# Patient Record
Sex: FEMALE | Race: WHITE | Employment: OTHER | URBAN - METROPOLITAN AREA
[De-identification: names, ages, dates, MRNs, and addresses within clinical notes are randomized per-mention and may not be internally consistent; named-entity substitution may affect disease eponyms.]

---

## 2024-06-09 ENCOUNTER — APPOINTMENT (OUTPATIENT)
Dept: GENERAL RADIOLOGY | Age: 79
DRG: 056 | End: 2024-06-09
Payer: MEDICARE

## 2024-06-09 ENCOUNTER — HOSPITAL ENCOUNTER (INPATIENT)
Age: 79
LOS: 9 days | Discharge: HOME | DRG: 056 | End: 2024-06-18
Attending: EMERGENCY MEDICINE | Admitting: INTERNAL MEDICINE
Payer: MEDICARE

## 2024-06-09 DIAGNOSIS — G12.22 BULBAR PALSY (HCC): ICD-10-CM

## 2024-06-09 DIAGNOSIS — R63.4 WEIGHT LOSS: ICD-10-CM

## 2024-06-09 DIAGNOSIS — I21.4 NSTEMI (NON-ST ELEVATED MYOCARDIAL INFARCTION) (HCC): ICD-10-CM

## 2024-06-09 DIAGNOSIS — R79.89 ELEVATED TROPONIN: ICD-10-CM

## 2024-06-09 DIAGNOSIS — R13.10 DYSPHAGIA, UNSPECIFIED TYPE: Primary | ICD-10-CM

## 2024-06-09 DIAGNOSIS — R47.1 DYSARTHRIA: ICD-10-CM

## 2024-06-09 PROBLEM — Z85.038 HISTORY OF COLON CANCER: Status: ACTIVE | Noted: 2024-06-09

## 2024-06-09 PROBLEM — I10 HYPERTENSION: Status: ACTIVE | Noted: 2024-06-09

## 2024-06-09 PROBLEM — J84.9 INTERSTITIAL LUNG DISEASE (HCC): Status: ACTIVE | Noted: 2024-06-09

## 2024-06-09 LAB
ALBUMIN SERPL-MCNC: 3.3 G/DL (ref 3.5–5.2)
ALBUMIN/GLOB SERPL: 0.7 {RATIO} (ref 1–2.5)
ALP SERPL-CCNC: 66 U/L (ref 35–104)
ALT SERPL-CCNC: <5 U/L (ref 10–35)
ANION GAP SERPL CALCULATED.3IONS-SCNC: 16 MMOL/L (ref 9–16)
ANTI-XA UNFRAC HEPARIN: <0.1 IU/L
AST SERPL-CCNC: 35 U/L (ref 10–35)
BASOPHILS # BLD: 0.04 K/UL (ref 0–0.2)
BASOPHILS NFR BLD: 0 % (ref 0–2)
BILIRUB SERPL-MCNC: 0.3 MG/DL (ref 0–1.2)
BUN SERPL-MCNC: 24 MG/DL (ref 8–23)
CALCIUM SERPL-MCNC: 9.2 MG/DL (ref 8.6–10.4)
CHLORIDE SERPL-SCNC: 96 MMOL/L (ref 98–107)
CO2 SERPL-SCNC: 24 MMOL/L (ref 20–31)
CREAT SERPL-MCNC: 0.9 MG/DL (ref 0.5–0.9)
CRP SERPL HS-MCNC: 35 MG/L (ref 0–5)
EOSINOPHIL # BLD: 0.05 K/UL (ref 0–0.44)
EOSINOPHILS RELATIVE PERCENT: 1 % (ref 1–4)
ERYTHROCYTE [DISTWIDTH] IN BLOOD BY AUTOMATED COUNT: 13.9 % (ref 11.8–14.4)
ERYTHROCYTE [DISTWIDTH] IN BLOOD BY AUTOMATED COUNT: 14 % (ref 11.8–14.4)
ERYTHROCYTE [SEDIMENTATION RATE] IN BLOOD BY PHOTOMETRIC METHOD: 84 MM/HR (ref 0–30)
GFR, ESTIMATED: 66 ML/MIN/1.73M2
GLUCOSE SERPL-MCNC: 77 MG/DL (ref 74–99)
HCT VFR BLD AUTO: 36.2 % (ref 36.3–47.1)
HCT VFR BLD AUTO: 40.7 % (ref 36.3–47.1)
HGB BLD-MCNC: 11.1 G/DL (ref 11.9–15.1)
HGB BLD-MCNC: 12.6 G/DL (ref 11.9–15.1)
IMM GRANULOCYTES # BLD AUTO: 0.03 K/UL (ref 0–0.3)
IMM GRANULOCYTES NFR BLD: 0 %
INR PPP: 1.1
LACTIC ACID, WHOLE BLOOD: 1.9 MMOL/L (ref 0.7–2.1)
LYMPHOCYTES NFR BLD: 0.75 K/UL (ref 1.1–3.7)
LYMPHOCYTES RELATIVE PERCENT: 7 % (ref 24–43)
MAGNESIUM SERPL-MCNC: 2.2 MG/DL (ref 1.6–2.4)
MCH RBC QN AUTO: 28 PG (ref 25.2–33.5)
MCH RBC QN AUTO: 28.1 PG (ref 25.2–33.5)
MCHC RBC AUTO-ENTMCNC: 30.7 G/DL (ref 28.4–34.8)
MCHC RBC AUTO-ENTMCNC: 31 G/DL (ref 28.4–34.8)
MCV RBC AUTO: 90.4 FL (ref 82.6–102.9)
MCV RBC AUTO: 91.6 FL (ref 82.6–102.9)
MONOCYTES NFR BLD: 0.68 K/UL (ref 0.1–1.2)
MONOCYTES NFR BLD: 6 % (ref 3–12)
NEUTROPHILS NFR BLD: 86 % (ref 36–65)
NEUTS SEG NFR BLD: 9.43 K/UL (ref 1.5–8.1)
NRBC BLD-RTO: 0 PER 100 WBC
NRBC BLD-RTO: 0 PER 100 WBC
PARTIAL THROMBOPLASTIN TIME: 26.1 SEC (ref 23–36.5)
PLATELET # BLD AUTO: 310 K/UL (ref 138–453)
PLATELET # BLD AUTO: 352 K/UL (ref 138–453)
PMV BLD AUTO: 9.7 FL (ref 8.1–13.5)
PMV BLD AUTO: 9.7 FL (ref 8.1–13.5)
POTASSIUM SERPL-SCNC: 3.6 MMOL/L (ref 3.7–5.3)
PROT SERPL-MCNC: 7.9 G/DL (ref 6.6–8.7)
PROTHROMBIN TIME: 14.2 SEC (ref 11.7–14.9)
RBC # BLD AUTO: 3.95 M/UL (ref 3.95–5.11)
RBC # BLD AUTO: 4.5 M/UL (ref 3.95–5.11)
SODIUM SERPL-SCNC: 136 MMOL/L (ref 136–145)
TROPONIN I SERPL HS-MCNC: 82 NG/L (ref 0–14)
TROPONIN I SERPL HS-MCNC: 93 NG/L (ref 0–14)
TSH SERPL DL<=0.05 MIU/L-ACNC: 2.2 UIU/ML (ref 0.27–4.2)
WBC OTHER # BLD: 11 K/UL (ref 3.5–11.3)
WBC OTHER # BLD: 9.8 K/UL (ref 3.5–11.3)

## 2024-06-09 PROCEDURE — 85520 HEPARIN ASSAY: CPT

## 2024-06-09 PROCEDURE — 85025 COMPLETE CBC W/AUTO DIFF WBC: CPT

## 2024-06-09 PROCEDURE — 85610 PROTHROMBIN TIME: CPT

## 2024-06-09 PROCEDURE — 71046 X-RAY EXAM CHEST 2 VIEWS: CPT

## 2024-06-09 PROCEDURE — 82164 ANGIOTENSIN I ENZYME TEST: CPT

## 2024-06-09 PROCEDURE — 86235 NUCLEAR ANTIGEN ANTIBODY: CPT

## 2024-06-09 PROCEDURE — 86140 C-REACTIVE PROTEIN: CPT

## 2024-06-09 PROCEDURE — 2580000003 HC RX 258: Performed by: STUDENT IN AN ORGANIZED HEALTH CARE EDUCATION/TRAINING PROGRAM

## 2024-06-09 PROCEDURE — 84484 ASSAY OF TROPONIN QUANT: CPT

## 2024-06-09 PROCEDURE — 2060000000 HC ICU INTERMEDIATE R&B

## 2024-06-09 PROCEDURE — 99223 1ST HOSP IP/OBS HIGH 75: CPT | Performed by: STUDENT IN AN ORGANIZED HEALTH CARE EDUCATION/TRAINING PROGRAM

## 2024-06-09 PROCEDURE — 99285 EMERGENCY DEPT VISIT HI MDM: CPT

## 2024-06-09 PROCEDURE — 93005 ELECTROCARDIOGRAM TRACING: CPT

## 2024-06-09 PROCEDURE — 86225 DNA ANTIBODY NATIVE: CPT

## 2024-06-09 PROCEDURE — 83605 ASSAY OF LACTIC ACID: CPT

## 2024-06-09 PROCEDURE — 36415 COLL VENOUS BLD VENIPUNCTURE: CPT

## 2024-06-09 PROCEDURE — 80053 COMPREHEN METABOLIC PANEL: CPT

## 2024-06-09 PROCEDURE — 6360000002 HC RX W HCPCS: Performed by: STUDENT IN AN ORGANIZED HEALTH CARE EDUCATION/TRAINING PROGRAM

## 2024-06-09 PROCEDURE — 85027 COMPLETE CBC AUTOMATED: CPT

## 2024-06-09 PROCEDURE — 2580000003 HC RX 258

## 2024-06-09 PROCEDURE — 85730 THROMBOPLASTIN TIME PARTIAL: CPT

## 2024-06-09 PROCEDURE — 84443 ASSAY THYROID STIM HORMONE: CPT

## 2024-06-09 PROCEDURE — 85652 RBC SED RATE AUTOMATED: CPT

## 2024-06-09 PROCEDURE — 83735 ASSAY OF MAGNESIUM: CPT

## 2024-06-09 PROCEDURE — 86038 ANTINUCLEAR ANTIBODIES: CPT

## 2024-06-09 RX ORDER — OMEPRAZOLE 20 MG/1
20 CAPSULE, DELAYED RELEASE ORAL 2 TIMES DAILY
Status: ON HOLD | COMMUNITY
End: 2024-06-13 | Stop reason: HOSPADM

## 2024-06-09 RX ORDER — SODIUM CHLORIDE 9 MG/ML
INJECTION, SOLUTION INTRAVENOUS CONTINUOUS
Status: DISCONTINUED | OUTPATIENT
Start: 2024-06-09 | End: 2024-06-11

## 2024-06-09 RX ORDER — ACETAMINOPHEN 650 MG/1
650 SUPPOSITORY RECTAL EVERY 6 HOURS PRN
Status: DISCONTINUED | OUTPATIENT
Start: 2024-06-09 | End: 2024-06-18 | Stop reason: HOSPADM

## 2024-06-09 RX ORDER — ACETAMINOPHEN 325 MG/1
650 TABLET ORAL EVERY 6 HOURS PRN
Status: DISCONTINUED | OUTPATIENT
Start: 2024-06-09 | End: 2024-06-18 | Stop reason: HOSPADM

## 2024-06-09 RX ORDER — HEPARIN SODIUM 1000 [USP'U]/ML
60 INJECTION, SOLUTION INTRAVENOUS; SUBCUTANEOUS PRN
Status: DISCONTINUED | OUTPATIENT
Start: 2024-06-09 | End: 2024-06-11

## 2024-06-09 RX ORDER — ALBUTEROL SULFATE 90 UG/1
2 AEROSOL, METERED RESPIRATORY (INHALATION) EVERY 6 HOURS PRN
COMMUNITY

## 2024-06-09 RX ORDER — ONDANSETRON 2 MG/ML
4 INJECTION INTRAMUSCULAR; INTRAVENOUS EVERY 6 HOURS PRN
Status: DISCONTINUED | OUTPATIENT
Start: 2024-06-09 | End: 2024-06-18 | Stop reason: HOSPADM

## 2024-06-09 RX ORDER — ONDANSETRON 4 MG/1
4 TABLET, ORALLY DISINTEGRATING ORAL EVERY 8 HOURS PRN
Status: DISCONTINUED | OUTPATIENT
Start: 2024-06-09 | End: 2024-06-18 | Stop reason: HOSPADM

## 2024-06-09 RX ORDER — POTASSIUM CHLORIDE 7.45 MG/ML
10 INJECTION INTRAVENOUS PRN
Status: DISCONTINUED | OUTPATIENT
Start: 2024-06-09 | End: 2024-06-18 | Stop reason: HOSPADM

## 2024-06-09 RX ORDER — FLUTICASONE FUROATE AND VILANTEROL 200; 25 UG/1; UG/1
1 POWDER RESPIRATORY (INHALATION) DAILY
COMMUNITY

## 2024-06-09 RX ORDER — HEPARIN SODIUM 10000 [USP'U]/100ML
5-30 INJECTION, SOLUTION INTRAVENOUS CONTINUOUS
Status: DISCONTINUED | OUTPATIENT
Start: 2024-06-09 | End: 2024-06-10

## 2024-06-09 RX ORDER — ATENOLOL 50 MG/1
50 TABLET ORAL DAILY
Status: ON HOLD | COMMUNITY
End: 2024-06-13 | Stop reason: HOSPADM

## 2024-06-09 RX ORDER — IPRATROPIUM BROMIDE AND ALBUTEROL SULFATE 2.5; .5 MG/3ML; MG/3ML
1 SOLUTION RESPIRATORY (INHALATION) EVERY 4 HOURS PRN
COMMUNITY

## 2024-06-09 RX ORDER — 0.9 % SODIUM CHLORIDE 0.9 %
1000 INTRAVENOUS SOLUTION INTRAVENOUS ONCE
Status: COMPLETED | OUTPATIENT
Start: 2024-06-09 | End: 2024-06-09

## 2024-06-09 RX ORDER — SODIUM CHLORIDE 0.9 % (FLUSH) 0.9 %
5-40 SYRINGE (ML) INJECTION EVERY 12 HOURS SCHEDULED
Status: DISCONTINUED | OUTPATIENT
Start: 2024-06-09 | End: 2024-06-18 | Stop reason: HOSPADM

## 2024-06-09 RX ORDER — SODIUM CHLORIDE 9 MG/ML
INJECTION, SOLUTION INTRAVENOUS PRN
Status: DISCONTINUED | OUTPATIENT
Start: 2024-06-09 | End: 2024-06-18 | Stop reason: HOSPADM

## 2024-06-09 RX ORDER — POTASSIUM CHLORIDE 20 MEQ/1
40 TABLET, EXTENDED RELEASE ORAL PRN
Status: DISCONTINUED | OUTPATIENT
Start: 2024-06-09 | End: 2024-06-18 | Stop reason: HOSPADM

## 2024-06-09 RX ORDER — HEPARIN SODIUM 1000 [USP'U]/ML
60 INJECTION, SOLUTION INTRAVENOUS; SUBCUTANEOUS ONCE
Status: COMPLETED | OUTPATIENT
Start: 2024-06-09 | End: 2024-06-09

## 2024-06-09 RX ORDER — SODIUM CHLORIDE 0.9 % (FLUSH) 0.9 %
5-40 SYRINGE (ML) INJECTION PRN
Status: DISCONTINUED | OUTPATIENT
Start: 2024-06-09 | End: 2024-06-18 | Stop reason: HOSPADM

## 2024-06-09 RX ORDER — POLYETHYLENE GLYCOL 3350 17 G/17G
17 POWDER, FOR SOLUTION ORAL DAILY PRN
Status: DISCONTINUED | OUTPATIENT
Start: 2024-06-09 | End: 2024-06-18 | Stop reason: HOSPADM

## 2024-06-09 RX ORDER — HEPARIN SODIUM 1000 [USP'U]/ML
30 INJECTION, SOLUTION INTRAVENOUS; SUBCUTANEOUS PRN
Status: DISCONTINUED | OUTPATIENT
Start: 2024-06-09 | End: 2024-06-10

## 2024-06-09 RX ORDER — MAGNESIUM SULFATE IN WATER 40 MG/ML
2000 INJECTION, SOLUTION INTRAVENOUS PRN
Status: DISCONTINUED | OUTPATIENT
Start: 2024-06-09 | End: 2024-06-18 | Stop reason: HOSPADM

## 2024-06-09 RX ADMIN — HEPARIN SODIUM 2400 UNITS: 1000 INJECTION INTRAVENOUS; SUBCUTANEOUS at 22:04

## 2024-06-09 RX ADMIN — SODIUM CHLORIDE, PRESERVATIVE FREE 10 ML: 5 INJECTION INTRAVENOUS at 22:11

## 2024-06-09 RX ADMIN — SODIUM CHLORIDE: 9 INJECTION, SOLUTION INTRAVENOUS at 22:08

## 2024-06-09 RX ADMIN — HEPARIN SODIUM 12 UNITS/KG/HR: 10000 INJECTION, SOLUTION INTRAVENOUS at 22:07

## 2024-06-09 RX ADMIN — SODIUM CHLORIDE 1000 ML: 9 INJECTION, SOLUTION INTRAVENOUS at 14:04

## 2024-06-09 ASSESSMENT — PAIN SCALES - GENERAL: PAINLEVEL_OUTOF10: 0

## 2024-06-09 ASSESSMENT — PAIN - FUNCTIONAL ASSESSMENT: PAIN_FUNCTIONAL_ASSESSMENT: NONE - DENIES PAIN

## 2024-06-09 NOTE — ED NOTES
The following labs were labeled with appropriate pt sticker and tubed to lab:     [x] Blue     [x] Lavender   [] on ice  [x] Green/yellow  [x] Green/black [] on ice  [] Ya  [] on ice  [x] Yellow  [] Red  [] Pink  [] Type/ Screen  [] ABG  [] VBG    [] COVID-19 swab    [] Rapid  [] PCR  [] Flu swab  [] Peds Viral Panel     [] Urine Sample  [] Fecal Sample  [] Pelvic Cultures  [] Blood Cultures  [] X 2  [] STREP Cultures  [] Wound Cultures

## 2024-06-09 NOTE — ED NOTES
Patient presents to ED for evaluation of dysphagia. Patient reports difficulty swallowing worsening for several months now, more severe recently. Patient reports feeling like something is stuck in her throat at baseline, but significantly worsens when she tries to eat or drink anything. Patient reports having an appetite, but being unable to swallow, so she has not been eating or drinking. Patient has been seen by multiple hospitals including in [place], [place] where she is from and also in [place], [place] where her sister lives. Patient reports testing was taking too long in [place] so her sister told her to come to [place] and they would do the tests faster so she did. Patient had EGD completed and was given medications that improved her symptoms initially, but they are no longer working.  Patient is alert and oriented x4, answering questions appropriately. Respirations even and unlabored. Patient changed into gown, placed on full cardiac monitor, BP cuff, and pulse ox. EKG completed. IV established. Call light within reach. Will continue to monitor.

## 2024-06-09 NOTE — ED PROVIDER NOTES
[facility] ED  Emergency Department Encounter  Emergency Medicine Resident     Pt Name:Chayito Navas  MRN: [id number]  Birthdate 1945  Date of evaluation: 6/9/24  PCP:  No primary care provider on file.  Note Started: 1:49 PM EDT      CHIEF COMPLAINT       Chief Complaint   Patient presents with    Dysphagia       HISTORY OF PRESENT ILLNESS  (Location/Symptom, Timing/Onset, Context/Setting, Quality, Duration, Modifying Factors, Severity.)      Chayito Navas is a 78 y.o. female with past medical history of hypertension on atenolol who presents with dysphagia that is been ongoing for 6 months.   helps provide history.   explains that patient has never had this issue in the past until 6 months ago and has progressively worsened.  She has difficulty tolerating solids and has been unable to tolerate liquids for the past 3 days.  They have been to multiple hospitals for evaluation with multiple rounds of imaging, including having an EGD performed at [facility] in [place] 5/13/2024.  Patient believes they dilated her esophagus at this time which caused some improvement before the symptoms returned.  She has been taking omeprazole with relief.   reports that the patient has had a significant weight loss over the last few months.  He also reports that her voice has become more muffled and he has difficulty understanding her now.  Patient states that she has some associated pain but more so feels a tightness and closing of her throat.  No chest pain or shortness of breath.  No abdominal pain, nausea, vomiting.    PAST MEDICAL / SURGICAL / SOCIAL / FAMILY HISTORY      has no past medical history on file.       has no past surgical history on file.      Social History     Socioeconomic History    Marital status:      Spouse name: Not on file    Number of children: Not on file    Years of education: Not on file    Highest education level: Not on file   Occupational

## 2024-06-09 NOTE — ED NOTES
Writer spoke with [facility] who states they never received a second green/yellow tube one hour ago for patient's repeat troponin.

## 2024-06-09 NOTE — ED NOTES
ED to inpatient nurses report      Chief Complaint:  Chief Complaint   Patient presents with    Dysphagia     Present to ED from: home    MOA:     LOC: alert and orientated to name, place, date  Mobility: Requires assistance * 1  Oxygen Baseline: room air    Current needs required: room air   Pending ED orders: none  Present condition: stable    Why did the patient come to the ED? dysphagia  What is the plan? GI consult  Any procedures or intervention occur? IV, labs, 1L IVF, EKG, CXR  Any safety concerns?? none    Mental Status:   WDL    Psych Assessment:   Psychosocial  Psychosocial (WDL): Within Defined Limits  Vital signs   Vitals:    06/09/24 1329 06/09/24 1336   BP: 126/68    Pulse: 67    Resp: 18    Temp: 99.3 °F (37.4 °C)    TempSrc: Oral    SpO2: 98%    Weight:  40 kg (88 lb 3.2 oz)        Vitals:  Patient Vitals for the past 24 hrs:   BP Temp Temp src Pulse Resp SpO2 Weight   06/09/24 1336 -- -- -- -- -- -- 40 kg (88 lb 3.2 oz)   06/09/24 1329 126/68 99.3 °F (37.4 °C) Oral 67 18 98 % --      Visit Vitals  /68   Pulse 67   Temp 99.3 °F (37.4 °C) (Oral)   Resp 18   Wt 40 kg (88 lb 3.2 oz)   SpO2 98%        LDAs:   Peripheral IV 06/09/24 Left;Anterior Forearm (Active)   Site Assessment Clean, dry & intact 06/09/24 1405   Line Status Blood return noted;Brisk blood return;Normal saline locked;Specimen collected;Flushed 06/09/24 1405   Phlebitis Assessment No symptoms 06/09/24 1405   Infiltration Assessment 0 06/09/24 1405   Dressing Status New dressing applied;Clean, dry & intact 06/09/24 1405   Dressing Type Transparent 06/09/24 1405       Ambulatory Status:  No data recorded    Diagnosis:  DISPOSITION Admitted 06/09/2024 06:31:12 PM   Final diagnoses:   Dysphagia, unspecified type   Weight loss   Elevated troponin        Consults:  IP CONSULT TO HOSPITALIST  IP CONSULT TO CARDIOLOGY  IP CONSULT TO GI     Treatment Team:   Treatment Team: Attending Provider: Luz Jordan MD; Registered Nurse: Reynold  components:       Result Value    Neutrophils % 86 (*)     Lymphocytes % 7 (*)     Neutrophils Absolute 9.43 (*)     Lymphocytes Absolute 0.75 (*)     All other components within normal limits   COMPREHENSIVE METABOLIC PANEL - Abnormal; Notable for the following components:    Potassium 3.6 (*)     Chloride 96 (*)     BUN 24 (*)     Albumin 3.3 (*)     Albumin/Globulin Ratio 0.7 (*)     ALT <5 (*)     All other components within normal limits   TROPONIN - Abnormal; Notable for the following components:    Troponin, High Sensitivity 93 (*)     All other components within normal limits   C-REACTIVE PROTEIN - Abnormal; Notable for the following components:    CRP 35.0 (*)     All other components within normal limits   SEDIMENTATION RATE - Abnormal; Notable for the following components:    Sed Rate, Automated 84 (*)     All other components within normal limits   TROPONIN - Abnormal; Notable for the following components:    Troponin, High Sensitivity 82 (*)     All other components within normal limits   LACTIC ACID   TSH WITH REFLEX   MAGNESIUM       Electronically signed by Shirley Flaherty RN on 6/9/2024 at 7:18 PM

## 2024-06-09 NOTE — H&P
file.    Review of Systems:     Positive and Negative as described in HPI.    Review of Systems   Constitutional:  Positive for activity change, appetite change and unexpected weight change. Negative for chills, fatigue and fever.   HENT:  Negative for congestion, hearing loss and rhinorrhea.    Eyes:  Negative for itching.   Respiratory:  Negative for cough and shortness of breath.         Bilaterally crackles   Cardiovascular:  Negative for chest pain, palpitations and leg swelling.   Gastrointestinal:  Negative for abdominal distention, abdominal pain, anal bleeding, blood in stool, constipation, nausea, rectal pain and vomiting.        Positive for dysphagia   Genitourinary:  Negative for difficulty urinating and dysuria.   Musculoskeletal:  Negative for arthralgias and back pain.   Skin:  Negative for rash and wound.   Neurological:  Negative for dizziness, seizures, light-headedness and headaches.   Psychiatric/Behavioral:  Negative for agitation and behavioral problems.        Physical Exam:   /68   Pulse 67   Temp 99.3 °F (37.4 °C) (Oral)   Resp 18   Wt 40 kg (88 lb 3.2 oz)   SpO2 98%   Temp (24hrs), Av.3 °F (37.4 °C), Min:99.3 °F (37.4 °C), Max:99.3 °F (37.4 °C)    No results for input(s): \"POCGLU\" in the last 72 hours.    Intake/Output Summary (Last 24 hours) at 2024 1826  Last data filed at 2024 1559  Gross per 24 hour   Intake 1000 ml   Output --   Net 1000 ml       Physical Exam  Constitutional:       Appearance: She is well-developed. She is ill-appearing.      Comments: Cachectic   HENT:      Head: Normocephalic and atraumatic.   Eyes:      Conjunctiva/sclera: Conjunctivae normal.   Neck:      Thyroid: No thyromegaly.      Vascular: No JVD.   Cardiovascular:      Rate and Rhythm: Normal rate and regular rhythm.      Heart sounds: Normal heart sounds. No murmur heard.  Pulmonary:      Effort: Pulmonary effort is normal.      Breath sounds: Normal breath sounds. No wheezing.       exacerbation.  RT adult aerosol protocol protocol  Hypertension-resume atenolol when able   colon cancer s/p colectomy, colostomy and reversal of colostomy (in 2012)      Consultations:   IP CONSULT TO HOSPITALIST     Patient is admitted as inpatient status because of co-morbidities listed above, severity of signs and symptoms as outlined, requirement for current medical therapies and most importantly because of direct risk to patient if care not provided in a hospital setting.  Expected length of stay > 48 hours.    Luz Jordan MD  6/9/2024  6:26 PM    Copy sent to Dr. Mora primary care provider on file.

## 2024-06-09 NOTE — ED PROVIDER NOTES
[facility] ED     Emergency Department     Faculty Attestation    I performed a history and physical examination of the patient and discussed management with the resident. I reviewed the resident’s note and agree with the documented findings and plan of care. Any areas of disagreement are noted on the chart. I was personally present for the key portions of any procedures. I have documented in the chart those procedures where I was not present during the key portions. I have reviewed the emergency nurses triage note. I agree with the chief complaint, past medical history, past surgical history, allergies, medications, social and family history as documented unless otherwise noted below. For Physician Assistant/ Nurse Practitioner cases/documentation I have personally evaluated this patient and have completed at least one if not all key elements of the E/M (history, physical exam, and MDM). Additional findings are as noted.    2:13 PM EDT    Patient presents with dysphagia that she has had for the past several months.  Patient was recently seen at [facility] in [place] for the symptoms and had an EGD done at that time which showed a normal cricopharyngeus lower esophageal sphincter and esophagus.  Patient has had multiple imaging studies of the neck and chest in the last few months for the symptoms.  Patient's  states that patient has lost significant amount of weight and patient says she has been unable to drink even water for the past 3 days.  Patient says she has been urinating well.  She denies any recent fevers.  On exam, patient is resting comfortably in the bed.  She does appear cachectic.  Mucous membranes are moist and skin turgor is good.  There is no stridor and patient is handling secretions without difficulty.  Patient does have a somewhat muffled voice.  The posterior pharynx appears normal.  Will obtain labs.  I do not feel that imaging is indicated at this time as patient

## 2024-06-10 ENCOUNTER — APPOINTMENT (OUTPATIENT)
Age: 79
DRG: 056 | End: 2024-06-10
Attending: STUDENT IN AN ORGANIZED HEALTH CARE EDUCATION/TRAINING PROGRAM
Payer: MEDICARE

## 2024-06-10 ENCOUNTER — APPOINTMENT (OUTPATIENT)
Dept: MRI IMAGING | Age: 79
DRG: 056 | End: 2024-06-10
Payer: MEDICARE

## 2024-06-10 PROBLEM — D53.9 ANEMIA ASSOCIATED WITH NUTRITIONAL DEFICIENCY: Status: ACTIVE | Noted: 2024-06-10

## 2024-06-10 PROBLEM — E46 MALNUTRITION (HCC): Status: ACTIVE | Noted: 2024-06-10

## 2024-06-10 PROBLEM — M06.9 RA (RHEUMATOID ARTHRITIS) (HCC): Status: ACTIVE | Noted: 2024-06-10

## 2024-06-10 PROBLEM — E43 SEVERE MALNUTRITION (HCC): Status: ACTIVE | Noted: 2024-06-10

## 2024-06-10 PROBLEM — R79.89 ELEVATED TROPONIN: Status: ACTIVE | Noted: 2024-06-10

## 2024-06-10 PROBLEM — I21.4 NSTEMI (NON-ST ELEVATED MYOCARDIAL INFARCTION) (HCC): Status: ACTIVE | Noted: 2024-06-10

## 2024-06-10 LAB
ANION GAP SERPL CALCULATED.3IONS-SCNC: 14 MMOL/L (ref 9–16)
ANTI-XA UNFRAC HEPARIN: 0.22 IU/L
ANTI-XA UNFRAC HEPARIN: 0.23 IU/L
ANTI-XA UNFRAC HEPARIN: <0.1 IU/L
BASOPHILS # BLD: <0.03 K/UL (ref 0–0.2)
BASOPHILS NFR BLD: 0 % (ref 0–2)
BUN SERPL-MCNC: 14 MG/DL (ref 8–23)
CALCIUM SERPL-MCNC: 7.9 MG/DL (ref 8.6–10.4)
CHLORIDE SERPL-SCNC: 102 MMOL/L (ref 98–107)
CO2 SERPL-SCNC: 22 MMOL/L (ref 20–31)
CREAT SERPL-MCNC: 0.7 MG/DL (ref 0.5–0.9)
CRP SERPL HS-MCNC: 66.3 MG/L (ref 0–5)
ECHO AO ROOT DIAM: 2.8 CM
ECHO AO ROOT INDEX: 2.17 CM/M2
ECHO AV AREA PEAK VELOCITY: 1.8 CM2
ECHO AV AREA VTI: 1.7 CM2
ECHO AV AREA/BSA PEAK VELOCITY: 1.4 CM2/M2
ECHO AV AREA/BSA VTI: 1.3 CM2/M2
ECHO AV MEAN GRADIENT: 4 MMHG
ECHO AV MEAN VELOCITY: 1 M/S
ECHO AV PEAK GRADIENT: 8 MMHG
ECHO AV PEAK VELOCITY: 1.4 M/S
ECHO AV VELOCITY RATIO: 0.79
ECHO AV VTI: 28.5 CM
ECHO BSA: 1.28 M2
ECHO EST RA PRESSURE: 8 MMHG
ECHO IVC PROX: 1.6 CM
ECHO LA AREA 2C: 10.7 CM2
ECHO LA AREA 4C: 10.6 CM2
ECHO LA DIAMETER INDEX: 2.02 CM/M2
ECHO LA DIAMETER: 2.6 CM
ECHO LA MAJOR AXIS: 5.1 CM
ECHO LA MINOR AXIS: 3.5 CM
ECHO LA TO AORTIC ROOT RATIO: 0.93
ECHO LA VOL MOD A2C: 26 ML (ref 22–52)
ECHO LA VOL MOD A4C: 17 ML (ref 22–52)
ECHO LA VOLUME INDEX MOD A2C: 20 ML/M2 (ref 16–34)
ECHO LA VOLUME INDEX MOD A4C: 13 ML/M2 (ref 16–34)
ECHO LV E' LATERAL VELOCITY: 11 CM/S
ECHO LV E' SEPTAL VELOCITY: 7 CM/S
ECHO LV EDV A2C: 45 ML
ECHO LV EDV A4C: 47 ML
ECHO LV EDV INDEX A4C: 36 ML/M2
ECHO LV EDV NDEX A2C: 35 ML/M2
ECHO LV EJECTION FRACTION A2C: 71 %
ECHO LV EJECTION FRACTION A4C: 65 %
ECHO LV EJECTION FRACTION BIPLANE: 69 % (ref 55–100)
ECHO LV ESV A2C: 13 ML
ECHO LV ESV A4C: 16 ML
ECHO LV ESV INDEX A2C: 10 ML/M2
ECHO LV ESV INDEX A4C: 12 ML/M2
ECHO LV INTERNAL DIMENSION DIASTOLE INDEX: 2.71 CM/M2
ECHO LV INTERNAL DIMENSION DIASTOLIC: 3.5 CM (ref 3.9–5.3)
ECHO LV IVSD: 0.9 CM (ref 0.6–0.9)
ECHO LV MASS 2D: 75.3 G (ref 67–162)
ECHO LV MASS INDEX 2D: 58.4 G/M2 (ref 43–95)
ECHO LV POSTERIOR WALL DIASTOLIC: 0.7 CM (ref 0.6–0.9)
ECHO LV RELATIVE WALL THICKNESS RATIO: 0.4
ECHO LVOT AREA: 2.3 CM2
ECHO LVOT AV VTI INDEX: 0.76
ECHO LVOT DIAM: 1.7 CM
ECHO LVOT MEAN GRADIENT: 2 MMHG
ECHO LVOT PEAK GRADIENT: 5 MMHG
ECHO LVOT PEAK VELOCITY: 1.1 M/S
ECHO LVOT STROKE VOLUME INDEX: 38.3 ML/M2
ECHO LVOT SV: 49.5 ML
ECHO LVOT VTI: 21.8 CM
ECHO MV A VELOCITY: 0.91 M/S
ECHO MV AREA VTI: 1.8 CM2
ECHO MV E DECELERATION TIME (DT): 220 MS
ECHO MV E VELOCITY: 0.93 M/S
ECHO MV E/A RATIO: 1.02
ECHO MV E/E' LATERAL: 8.45
ECHO MV E/E' RATIO (AVERAGED): 10.87
ECHO MV E/E' SEPTAL: 13.29
ECHO MV LVOT VTI INDEX: 1.28
ECHO MV MAX VELOCITY: 1.2 M/S
ECHO MV MEAN GRADIENT: 3 MMHG
ECHO MV MEAN VELOCITY: 0.8 M/S
ECHO MV PEAK GRADIENT: 6 MMHG
ECHO MV VTI: 27.8 CM
ECHO PULMONARY ARTERY END DIASTOLIC PRESSURE: 4 MMHG
ECHO PV MAX VELOCITY: 1.2 M/S
ECHO PV PEAK GRADIENT: 6 MMHG
ECHO PV REGURGITANT MAX VELOCITY: 1.1 M/S
ECHO RIGHT VENTRICULAR SYSTOLIC PRESSURE (RVSP): 29 MMHG
ECHO RV BASAL DIMENSION: 2.4 CM
ECHO RV INTERNAL DIMENSION: 2 CM
ECHO RV MID DIMENSION: 2.3 CM
ECHO RV TAPSE: 1.8 CM (ref 1.7–?)
ECHO TV PEAK GRADIENT: 25 MMHG
ECHO TV REGURGITANT MAX VELOCITY: 2.31 M/S
ECHO TV REGURGITANT PEAK GRADIENT: 21 MMHG
EKG ATRIAL RATE: 90 BPM
EKG P AXIS: 56 DEGREES
EKG P-R INTERVAL: 142 MS
EKG Q-T INTERVAL: 402 MS
EKG QRS DURATION: 114 MS
EKG QTC CALCULATION (BAZETT): 491 MS
EKG R AXIS: -25 DEGREES
EKG T AXIS: -4 DEGREES
EKG VENTRICULAR RATE: 90 BPM
EOSINOPHIL # BLD: 0.06 K/UL (ref 0–0.44)
EOSINOPHILS RELATIVE PERCENT: 1 % (ref 1–4)
ERYTHROCYTE [DISTWIDTH] IN BLOOD BY AUTOMATED COUNT: 14 % (ref 11.8–14.4)
ERYTHROCYTE [SEDIMENTATION RATE] IN BLOOD BY PHOTOMETRIC METHOD: 64 MM/HR (ref 0–30)
GFR, ESTIMATED: 90 ML/MIN/1.73M2
GLUCOSE BLD-MCNC: 115 MG/DL (ref 65–105)
GLUCOSE BLD-MCNC: 63 MG/DL (ref 65–105)
GLUCOSE SERPL-MCNC: 63 MG/DL (ref 74–99)
HCT VFR BLD AUTO: 34.1 % (ref 36.3–47.1)
HGB BLD-MCNC: 10.5 G/DL (ref 11.9–15.1)
IMM GRANULOCYTES # BLD AUTO: 0.03 K/UL (ref 0–0.3)
IMM GRANULOCYTES NFR BLD: 0 %
LYMPHOCYTES NFR BLD: 0.89 K/UL (ref 1.1–3.7)
LYMPHOCYTES RELATIVE PERCENT: 9 % (ref 24–43)
MCH RBC QN AUTO: 28.3 PG (ref 25.2–33.5)
MCHC RBC AUTO-ENTMCNC: 30.8 G/DL (ref 28.4–34.8)
MCV RBC AUTO: 91.9 FL (ref 82.6–102.9)
MONOCYTES NFR BLD: 0.72 K/UL (ref 0.1–1.2)
MONOCYTES NFR BLD: 8 % (ref 3–12)
NEUTROPHILS NFR BLD: 82 % (ref 36–65)
NEUTS SEG NFR BLD: 7.81 K/UL (ref 1.5–8.1)
NRBC BLD-RTO: 0 PER 100 WBC
PLATELET # BLD AUTO: 308 K/UL (ref 138–453)
PMV BLD AUTO: 9.8 FL (ref 8.1–13.5)
POTASSIUM SERPL-SCNC: 3.6 MMOL/L (ref 3.7–5.3)
RBC # BLD AUTO: 3.71 M/UL (ref 3.95–5.11)
SODIUM SERPL-SCNC: 138 MMOL/L (ref 136–145)
TROPONIN I SERPL HS-MCNC: 86 NG/L (ref 0–14)
TROPONIN I SERPL HS-MCNC: 94 NG/L (ref 0–14)
TROPONIN I SERPL HS-MCNC: 99 NG/L (ref 0–14)
WBC OTHER # BLD: 9.5 K/UL (ref 3.5–11.3)

## 2024-06-10 PROCEDURE — 99233 SBSQ HOSP IP/OBS HIGH 50: CPT | Performed by: INTERNAL MEDICINE

## 2024-06-10 PROCEDURE — 84484 ASSAY OF TROPONIN QUANT: CPT

## 2024-06-10 PROCEDURE — 93306 TTE W/DOPPLER COMPLETE: CPT

## 2024-06-10 PROCEDURE — 86041 ACETYLCHOLN RCPTR BNDNG ANTB: CPT

## 2024-06-10 PROCEDURE — 82947 ASSAY GLUCOSE BLOOD QUANT: CPT

## 2024-06-10 PROCEDURE — 85652 RBC SED RATE AUTOMATED: CPT

## 2024-06-10 PROCEDURE — 86140 C-REACTIVE PROTEIN: CPT

## 2024-06-10 PROCEDURE — 83516 IMMUNOASSAY NONANTIBODY: CPT

## 2024-06-10 PROCEDURE — 6370000000 HC RX 637 (ALT 250 FOR IP): Performed by: INTERNAL MEDICINE

## 2024-06-10 PROCEDURE — 36415 COLL VENOUS BLD VENIPUNCTURE: CPT

## 2024-06-10 PROCEDURE — 31575 DIAGNOSTIC LARYNGOSCOPY: CPT | Performed by: OTOLARYNGOLOGY

## 2024-06-10 PROCEDURE — 99222 1ST HOSP IP/OBS MODERATE 55: CPT | Performed by: INTERNAL MEDICINE

## 2024-06-10 PROCEDURE — 99222 1ST HOSP IP/OBS MODERATE 55: CPT | Performed by: PSYCHIATRY & NEUROLOGY

## 2024-06-10 PROCEDURE — 86255 FLUORESCENT ANTIBODY SCREEN: CPT

## 2024-06-10 PROCEDURE — 6360000002 HC RX W HCPCS: Performed by: INTERNAL MEDICINE

## 2024-06-10 PROCEDURE — 82164 ANGIOTENSIN I ENZYME TEST: CPT

## 2024-06-10 PROCEDURE — 2060000000 HC ICU INTERMEDIATE R&B

## 2024-06-10 PROCEDURE — 85520 HEPARIN ASSAY: CPT

## 2024-06-10 PROCEDURE — 93306 TTE W/DOPPLER COMPLETE: CPT | Performed by: INTERNAL MEDICINE

## 2024-06-10 PROCEDURE — 86042 ACETYLCHOLN RCPTR BLCKG ANTB: CPT

## 2024-06-10 PROCEDURE — 6360000002 HC RX W HCPCS: Performed by: STUDENT IN AN ORGANIZED HEALTH CARE EDUCATION/TRAINING PROGRAM

## 2024-06-10 PROCEDURE — APPNB60 APP NON BILLABLE TIME 46-60 MINS: Performed by: INTERNAL MEDICINE

## 2024-06-10 PROCEDURE — 86225 DNA ANTIBODY NATIVE: CPT

## 2024-06-10 PROCEDURE — 2580000003 HC RX 258: Performed by: STUDENT IN AN ORGANIZED HEALTH CARE EDUCATION/TRAINING PROGRAM

## 2024-06-10 PROCEDURE — 2580000003 HC RX 258: Performed by: INTERNAL MEDICINE

## 2024-06-10 PROCEDURE — 99222 1ST HOSP IP/OBS MODERATE 55: CPT | Performed by: OTOLARYNGOLOGY

## 2024-06-10 PROCEDURE — 86038 ANTINUCLEAR ANTIBODIES: CPT

## 2024-06-10 PROCEDURE — 80048 BASIC METABOLIC PNL TOTAL CA: CPT

## 2024-06-10 PROCEDURE — 85025 COMPLETE CBC W/AUTO DIFF WBC: CPT

## 2024-06-10 PROCEDURE — 0CJY8ZZ INSPECTION OF MOUTH AND THROAT, VIA NATURAL OR ARTIFICIAL OPENING ENDOSCOPIC: ICD-10-PCS | Performed by: OTOLARYNGOLOGY

## 2024-06-10 RX ORDER — DEXTROSE MONOHYDRATE 100 MG/ML
INJECTION, SOLUTION INTRAVENOUS CONTINUOUS PRN
Status: DISCONTINUED | OUTPATIENT
Start: 2024-06-10 | End: 2024-06-18 | Stop reason: HOSPADM

## 2024-06-10 RX ORDER — PANTOPRAZOLE SODIUM 40 MG/1
40 TABLET, DELAYED RELEASE ORAL
Status: DISCONTINUED | OUTPATIENT
Start: 2024-06-11 | End: 2024-06-12

## 2024-06-10 RX ORDER — ATORVASTATIN CALCIUM 40 MG/1
40 TABLET, FILM COATED ORAL NIGHTLY
Status: DISCONTINUED | OUTPATIENT
Start: 2024-06-10 | End: 2024-06-10

## 2024-06-10 RX ORDER — ASPIRIN 81 MG/1
81 TABLET, CHEWABLE ORAL DAILY
Status: DISCONTINUED | OUTPATIENT
Start: 2024-06-10 | End: 2024-06-18 | Stop reason: HOSPADM

## 2024-06-10 RX ORDER — GLUCAGON 1 MG/ML
1 KIT INJECTION PRN
Status: DISCONTINUED | OUTPATIENT
Start: 2024-06-10 | End: 2024-06-11 | Stop reason: SDUPTHER

## 2024-06-10 RX ORDER — BUDESONIDE AND FORMOTEROL FUMARATE DIHYDRATE 80; 4.5 UG/1; UG/1
2 AEROSOL RESPIRATORY (INHALATION)
Status: DISCONTINUED | OUTPATIENT
Start: 2024-06-10 | End: 2024-06-18 | Stop reason: HOSPADM

## 2024-06-10 RX ORDER — ATORVASTATIN CALCIUM 20 MG/1
20 TABLET, FILM COATED ORAL NIGHTLY
Status: DISCONTINUED | OUTPATIENT
Start: 2024-06-10 | End: 2024-06-18 | Stop reason: HOSPADM

## 2024-06-10 RX ORDER — ALBUTEROL SULFATE 90 UG/1
2 AEROSOL, METERED RESPIRATORY (INHALATION) EVERY 6 HOURS PRN
Status: DISCONTINUED | OUTPATIENT
Start: 2024-06-10 | End: 2024-06-18 | Stop reason: HOSPADM

## 2024-06-10 RX ORDER — HEPARIN SODIUM 5000 [USP'U]/ML
5000 INJECTION, SOLUTION INTRAVENOUS; SUBCUTANEOUS EVERY 8 HOURS SCHEDULED
Status: DISCONTINUED | OUTPATIENT
Start: 2024-06-10 | End: 2024-06-18 | Stop reason: HOSPADM

## 2024-06-10 RX ORDER — GLUCAGON 1 MG/ML
1 KIT INJECTION PRN
Status: DISCONTINUED | OUTPATIENT
Start: 2024-06-10 | End: 2024-06-18 | Stop reason: HOSPADM

## 2024-06-10 RX ADMIN — DEXTROSE MONOHYDRATE 125 ML: 100 INJECTION, SOLUTION INTRAVENOUS at 21:47

## 2024-06-10 RX ADMIN — HEPARIN SODIUM 1200 UNITS: 1000 INJECTION INTRAVENOUS; SUBCUTANEOUS at 13:15

## 2024-06-10 RX ADMIN — HEPARIN SODIUM 5000 UNITS: 5000 INJECTION INTRAVENOUS; SUBCUTANEOUS at 21:48

## 2024-06-10 RX ADMIN — HEPARIN SODIUM 2400 UNITS: 1000 INJECTION INTRAVENOUS; SUBCUTANEOUS at 06:27

## 2024-06-10 RX ADMIN — ASPIRIN 81 MG 81 MG: 81 TABLET ORAL at 16:01

## 2024-06-10 RX ADMIN — SODIUM CHLORIDE, PRESERVATIVE FREE 10 ML: 5 INJECTION INTRAVENOUS at 21:56

## 2024-06-10 NOTE — PROGRESS NOTES
[facility]  Office: [phone]  Dario Verma DO, Dustin Amador DO, Nathan De La Cruz DO, Juarez Su DO, April Zamorano MD, Erlinda Sanchez MD, Everette Harden MD, Bridget Rowe MD,  Celestino Smith MD, Arianna Lopes MD, Kade Joaquin MD,  Brigette Almonte DO, Kolton Linder MD, Vijay Childers MD, Timothy Verma DO, Jenn Camp MD,  Lawson Diallo DO, Lucy Liriano MD, Allyn Kruger MD, Loulou Resendiz MD, Luz Jordan MD,  Dick Stock MD, Shanon Ugalde MD, Vy Armendariz MD, Brennan Yo MD, Ramiro Kent MD, Alan Thompson MD, Niko King DO, Roberto Bhatt DO, Gianfranco Henriquez MD,  Fernandez Tilley MD, Shirley Waterhouse, CNP,  Yoanna Medina CNP, Juarez Webb, CNP,  Merary Crespo, DNP, Ilda Jean-Baptiste, CNP, Milagros Messina, CNP, Florence Saenz, CNP, Cristina Belle, CNP, Devika Olvera, PA-C, Mary Kirkpatrick PA-C, Cherry Watson, CNP, Erma Cifuentes, CNP, Benny Marie, CNP, Pebbles Proctor, CNP, Holly Jones, CNP, Altagracia Iglesias, CNS, Shannan Cruz, CNP, Erika Velez CNP, Tracy Schwab, CNP         [facility]   IN-PATIENT SERVICE   [facility]    Progress Note    6/10/2024    11:33 AM    Name:   Chayito Navas  MRN:     [id number]     Acct:      [id number]   Room:   0535/0535-01   Day:  1  Admit Date:  6/9/2024  1:32 PM    PCP:   Brijesh Perez MD  Code Status:  Full Code    Subjective:     C/C:   Chief Complaint   Patient presents with    Dysphagia     Interval History Status: not changed.     Patient has no changes since last night. Having difficulty swallowing. She is on IV fluids. She has no chest pain. Trops were elevated but flat.  She has long history of dysphagia and she has been evaluated by GI, ENT, Neurology in the past. She has CT soft tissue neck, chest which were unremarkable. She has had manometry and several abnormal swallow studies. She had recent esophageal dilatation which did not help.    She has Myasthenia gravis

## 2024-06-10 NOTE — CONSULTS
CONSULTING SERVICE: Otolaryngology-Head and Neck Surgery    Informant:   The history was obtained from chart review and the patient.    Chief Complaint:   Her chief complaint is trouble swallowing    History of Present Illness:   Chayito Navas is a 78 y.o. female seen consultation at the request of Internal Medicine on 6/10/2024.      78 year old female with a complex medical history that includes interstitial lung disease, rheumatoid arthritis, hypertension, asthma, and colon cancer status post treatment in 2012, admitted with progressive dysphagia over the past six months. She initially was having difficulty swallowing solids only, but now is having issues with liquids and her saliva. She denies pain with swallowing, but feels like things get stuck when she tries to swallow and she has to spit it back out. She has reportedly lost 60 pounds over the past six months. She has been seen at [facility] in [place] and has had evaluation for this issue, including EGD/ Dilation in May. She also reports a change in her voice and states it is difficult to talk. She denies respiratory concerns.     Additional evaluations have been performed in [place] are detailed in the admission H&P.      Other Pertinent ENT-specific HPI:  None    Pertinent Social/Birth/Family/Medical/Surgical History   Past Medical History: No past medical history on file.  Past Surgical History: No past surgical history on file.  Allergies:   Allergies   Allergen Reactions    Pcn [Penicillins] Rash      Medications:   Current Facility-Administered Medications   Medication Dose Route Frequency Provider Last Rate Last Admin    albuterol sulfate HFA (PROVENTIL;VENTOLIN;PROAIR) 108 (90 Base) MCG/ACT inhaler 2 puff  2 puff Inhalation Q6H PRN Lucy Liriano MD        budesonide-formoterol (SYMBICORT) 80-4.5 MCG/ACT inhaler 2 puff  2 puff Inhalation BID RT Lucy Liriano MD        [START ON 6/11/2024] pantoprazole (PROTONIX) tablet 40 mg  40

## 2024-06-10 NOTE — CONSULTS
Cardiology Consultation         Date:   6/10/2024  Patient name: Chayito Navas  Date of admission:  6/9/2024  1:32 PM  MRN:   [id number]  YOB: 1945    Reason for Admission: dysphagia     Chief Complaint: difficulty swallowing     History of present illness:     78 yr old female with no pertinent cardiac hx admitted for management of dysphagia. She does have extensive hx of dysphagia and recently underwent EGD with esophageal dilatation. She has been evaluated by neurology and ENT. She continues to report significant difficulty in swallowing food and feels like it gets stuck in the throat. She reports one episode of chest pain yesterday which was short lasting with no recurrence. No dyspnea. No orthopnea or leg edema. Cardiology consulted for elevated troponins of 93, 82, 94 and 99.       Past Medical History:   has no past medical history on file.    Past Surgical History:   has no past surgical history on file.     Home Medications:    Prior to Admission medications    Medication Sig Start Date End Date Taking? Authorizing Provider   fluticasone furoate-vilanterol (BREO ELLIPTA) 200-25 MCG/ACT AEPB inhaler Inhale 1 puff into the lungs daily   Yes Monica Schrader MD   omeprazole (PRILOSEC) 20 MG delayed release capsule Take 1 capsule by mouth 2 times daily Before meals   Yes Monica Schrader MD   vitamin D (D3) 25 MCG (1000 UT) CAPS Take 1 capsule by mouth daily   Yes Monica Schrader MD   atenolol (TENORMIN) 50 MG tablet Take 1 tablet by mouth daily   Yes Monica Schrader MD   albuterol sulfate HFA (VENTOLIN HFA) 108 (90 Base) MCG/ACT inhaler Inhale 2 puffs into the lungs every 6 hours as needed for Wheezing   Yes Monica Schrader MD   ipratropium 0.5 mg-albuterol 2.5 mg (DUONEB) 0.5-2.5 (3) MG/3ML SOLN nebulizer solution Take 3 mLs by nebulization every 4 hours as needed for Shortness of Breath   Yes Monica Schrader MD       Allergies:  Obeyn

## 2024-06-10 NOTE — CARE COORDINATION
Case Management Assessment  Initial Evaluation    Date/Time of Evaluation: 6/10/2024 9:25AM  Assessment Completed by: Bailey Dominique RN    If patient is discharged prior to next notation, then this note serves as note for discharge by case management.    Patient Name: Chayito Navas                   YOB: 1945  Diagnosis: Weight loss [R63.4]  Elevated troponin [R79.89]  Dysphagia [R13.10]  Dysphagia, unspecified type [R13.10]                   Date / Time: 6/9/2024  1:32 PM    Patient Admission Status: Inpatient   Readmission Risk (Low < 19, Mod (19-27), High > 27): Readmission Risk Score: 10.3    Current PCP: Brijesh Perez MD  PCP verified by CM? (P) Yes (Brijesh Perez)    Chart Reviewed: Yes      History Provided by: (P) Patient  Patient Orientation: (P) Alert and Oriented, Person, Place    Patient Cognition: (P) Alert    Hospitalization in the last 30 days (Readmission):  No    If yes, Readmission Assessment in  Navigator will be completed.    Advance Directives:      Code Status: Full Code   Patient's Primary Decision Maker is: (P) Legal Next of Kin      Discharge Planning:    Patient lives with: (P) Spouse/Significant Other, Children Type of Home: (P) House  Primary Care Giver: (P) Self  Patient Support Systems include: (P) Spouse/Significant Other, Children, Family Members   Current Financial resources:    Current community resources:    Current services prior to admission: (P) None            Current DME:              Type of Home Care services:  (P) None    ADLS  Prior functional level: (P) Independent in ADLs/IADLs  Current functional level: (P) Independent in ADLs/IADLs    PT AM-PAC:   /24  OT AM-PAC:   /24    Family can provide assistance at DC: (P) Yes  Would you like Case Management to discuss the discharge plan with any other family members/significant others, and if so, who? (P) No  Plans to Return to Present Housing: (P) Yes  Other Identified Issues/Barriers to RETURNING to  current housing: none  Potential Assistance needed at discharge: (P) N/A            Potential DME:    Patient expects to discharge to: (P) House  Plan for transportation at discharge:  family    Financial    Payor: BCBS / Plan: BCBS - [place] HMO / Product Type: *No Product type* /     Does insurance require precert for SNF: Yes, but plan is home    Potential assistance Purchasing Medications: (P) No  Meds-to-Beds request: Yes      [facility] [id number] [facility] [address] [place] [address] [phone] - F [phone]  [address] [facility] [address]  Phone: [phone] Fax: [phone]      Notes:    Factors facilitating achievement of predicted outcomes: Family support, Motivated, Cooperative, and Pleasant    Barriers to discharge: none    Additional Case Management Notes: Transitional planning-talked with patient. Plan is to go home with her  Leroy, and daughter. Has ride. Verified address, emergency contacts and insurance with patient.  letter given to patient.    The Plan for Transition of Care is related to the following treatment goals of Weight loss [R63.4]  Elevated troponin [R79.89]  Dysphagia [R13.10]  Dysphagia, unspecified type [R13.10]    IF APPLICABLE: The Patient and/or patient representative Chayito and her family were provided with a choice of provider and agrees with the discharge plan. Freedom of choice list with basic dialogue that supports the patient's individualized plan of care/goals and shares the quality data associated with the providers was provided to: (P) Patient   Patient Representative Name:       The Patient and/or Patient Representative Agree with the Discharge Plan? (P) Yes    Bailey Dominique RN  Case Management Department  Ph: [phone] Fax: [phone]

## 2024-06-10 NOTE — PROGRESS NOTES
Comprehensive Nutrition Assessment    Type and Reason for Visit:  Initial, Positive Nutrition Screen    Nutrition Recommendations/Plan:   Recommend nutrition support (prefer enteral) if Pt unable to tolerate oral intake in the next 1-2 days  Send standard high calorie, high protein ONS TID with meals     Malnutrition Assessment:  Malnutrition Status:  Severe malnutrition (06/10/24 0946)    Context:  Chronic Illness     Findings of the 6 clinical characteristics of malnutrition:  Energy Intake:  75% or less estimated energy requirements for 1 month or longer  Weight Loss:  Greater than 10% over 6 months     Body Fat Loss:  Severe body fat loss Triceps, Orbital, Buccal region   Muscle Mass Loss:  Severe muscle mass loss Temples (temporalis), Clavicles (pectoralis & deltoids), Hand (interosseous)  Fluid Accumulation:  No significant fluid accumulation     Strength:  Not Performed    Nutrition Assessment:    Pt admitted with dysphagia and weight loss with Hx ILD, RA, HTN, Asthma, colon CA with colectomy and colostomy followed by colostomy reversal, as well as recent EGD on 5/24 with esophageal dilation.  Spoke with Pt who appears emaciated wtih obvious fat, muscle and fat loss.  Pt reports she has had gradually worsening swallowing difficulty for the past 6 months.  She reports she has been eating less and less over time, coughing and feeling as if she is choking on foods and most recently beverages.  She did start to drink Boost at home (vanilla) which has helped, but for the past 3 days she has not been able to drink anything either.  Everything she eats and drinks causes coughing and vomiting.  GI and SLP have been consulted, and Pt is currently trying to eat Soft and bite sized breakfast, though was chewing on the same bite of food the entire duration of visit.  Though no weight history is available, Pt reports significant weight loss of over 60# in the past 6 months (41% loss).  Pt would benefit from nutrition

## 2024-06-10 NOTE — PROGRESS NOTES
[facility]  Occupational Therapy Not Seen Note    DATE: 6/10/2024    NAME: Chayito Navas  MRN: [id number]   : 1945      Patient not seen this date for Occupational Therapy due to:    Testing: ECHO    Next Scheduled Treatment: Ck in pm or     Electronically signed by Irma Garcia OT on 6/10/2024 at 10:29 AM

## 2024-06-10 NOTE — CONSULTS
[facility] Neurology   IN-PATIENT SERVICE   [facility]    NEUROLOGY CONSULT NOTE            Date:   6/10/2024  Patient name:  Chayito Navas  Date of admission:  6/9/2024  1:32 PM  MRN:   [id number]  Account:  [id number]  YOB: 1945  PCP:    Brijesh Perez MD  Room:   [address] [place] [address]  Code Status:    Full Code    Chief Complaint:     Chief Complaint   Patient presents with    Dysphagia       History Obtained From:     patient, electronic medical record    History of Present Illness:     Chayito Navas is a 78 y.o. female with a past medical history of interstitial lung disease, rheumatoid arthritis, hypertension, asthma, colon cancer, and esophageal Schatzki's ring who presents with 6-month history of progressive dysphagia.  Advanced from solids to include now liquids.  Patient had prior extensive workup with GI including multiple EGD with dilations of Schatzki's ring.  Esophageal manometry was normal.  Modified barium swallow done in October 2023 showed substantial delay of entire swallowing process, no aspiration.  Patient is to get PEG tube tomorrow.  Also having NSTEMI on heparin drip.  Neurology consulted for evaluation of her dysphagia.  Patient is very hard to understand due to marked bulbar palsy.  On exam, patient has plegic tongue with atrophy, no fasciculations, absent gag reflex, as well as right upper extremity weakness, and bilateral upper extremity hyperreflexia.  Bilateral lower extremity hyperreflexia to absent reflexes.  No recent brain imaging done.  \    Past Medical History:     No past medical history on file.     Past Surgical History:     No past surgical history on file.     Medications Prior to Admission:     Prior to Admission medications    Medication Sig Start Date End Date Taking? Authorizing Provider   fluticasone furoate-vilanterol (BREO ELLIPTA) 200-25 MCG/ACT AEPB inhaler Inhale 1 puff into the lungs daily   Yes Provider, MD Monica  reflex, plegic tongue, and preserved palate rise.  She has right upper extremity weakness and bilateral UE hyperreflexia with absent LE reflexes.  Normal affect.  Clinical suspicion for bulbar palsy secondary to cortical vs subcortical infarct vs ALS vs seronegative myasthenia.     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 Phill Butler MD  Transitional Year Resident PGY-1  6/10/2024   3:19 PM    Copy sent to Brijesh Curry MD    This note is created with the assistance of a speech-recognition program. While intending to generate a document that actually reflects the content of the visit, the document can still have some errors including those of syntax and sound a- like substitutions which may escape proofreading. In such instances, actual meaning can be extrapolated by contextual derivation.

## 2024-06-10 NOTE — CONSULTS
[facility]    GASTROENTEROLOGY CONSULT    Patient:   Chayito Navas   :    1945   Facility:   [facility]   Date:    6/10/2024  Admission Dx:  Weight loss [R63.4]  Elevated troponin [R79.89]  Dysphagia [R13.10]  Dysphagia, unspecified type [R13.10]  Requesting physician: Lucy Liriano MD  Reason for consult:  Dysphagia  CC : \"Difficulty swallowing\"    SUBJECTIVE     HISTORY OF PRESENT ILLNESS  This is a 78 y.o.  female who was admitted 2024 with Weight loss [R63.4]  Elevated troponin [R79.89]  Dysphagia [R13.10]  Dysphagia, unspecified type [R13.10]. We have been asked to see the patient in consultation by Lucy Liriano MD for dysphagia    78-year-old female with a history of interstitial lung disease, chronic cough, asthma, HTN, colon CA (surgical resection ~) who presented to the hospital for evaluation of dysphagia.     Patient reports over 6-month history of difficulty swallowing with associated 64 pound weight loss.  Appetite is intact.  Upon talking to patient, she indicates it feels as if food gets caught near the sternal notch. Patient reports she has been having progressive dysphagia from solids and now liquids.  She has had to intentionally regurgitate solid food up and states she has not been able to tolerate any solids for the past 3 to 4 days.  She has been able to tolerate very small amount of liquids.     Patient has been following with several providers and etiology remains unclear.  Patient has been seen at by other GI locally with Dr. Alejandro at [facility].  EGD completed 2023 found no endoscopic esophageal abnormality to explain patient's dysphagia, esophagus dilated.  Swallow study 10/2023 showed no signs of aspiration. Substantial delay of the entire swallowing process as well as stasis of content in the vallecula and proximal esophagus.  She underwent esophageal manometry 2024 which was read as  HFA (VENTOLIN HFA) 108 (90 Base) MCG/ACT inhaler Inhale 2 puffs into the lungs every 6 hours as needed for Wheezing   Yes ProviderMonica MD   ipratropium 0.5 mg-albuterol 2.5 mg (DUONEB) 0.5-2.5 (3) MG/3ML SOLN nebulizer solution Take 3 mLs by nebulization every 4 hours as needed for Shortness of Breath   Yes Provider, MD Monica       CURRENT MEDICATIONS:  Scheduled Meds:   sodium chloride flush  5-40 mL IntraVENous 2 times per day     Continuous Infusions:   sodium chloride 100 mL/hr at 06/10/24 0635    sodium chloride      heparin (PORCINE) Infusion 18 Units/kg/hr (06/10/24 1314)     PRN Meds:sodium chloride flush, sodium chloride, potassium chloride **OR** potassium alternative oral replacement **OR** potassium chloride, magnesium sulfate, ondansetron **OR** ondansetron, polyethylene glycol, acetaminophen **OR** acetaminophen, heparin (porcine), heparin (porcine)    SOCIAL HISTORY:     Tobacco:   has no history of tobacco use.  Alcohol:   has no history of alcohol use.  Illicit drugs:  has no history of drug use.    FAMILY HISTORY:     No family history on file.    REVIEW OF SYSTEMS:    Constitutional: No fever, no chills, no lethargy, + weakness.  HEENT:  No headache, otalgia, itchy eyes, nasal discharge or sore throat.  Cardiac:  No chest pain, dyspnea, orthopnea or PND.  Chest:   No cough, phlegm or wheezing.  Abdomen:  No abdominal pain, nausea or vomiting.+ Dysphagia with unintentional weight loss  Neuro:  No focal weakness, abnormal movements or seizure like activity.  Skin:   No rashes, no itching.  :   No hematuria, no pyuria, no dysuria, no flank pain.  Extremities:  No swelling or joint pains.  ROS was otherwise negative except as mentioned in the [place].     PHYSICAL EXAM:    BP (!) 129/56   Pulse 83   Temp 98.8 °F (37.1 °C) (Oral)   Resp 21   Ht 1.49 m (4' 10.66\")   Wt 39.5 kg (87 lb)   SpO2 99%   BMI 17.78 kg/m²     GENERAL: Chronically ill,  well developed, under nourished, No

## 2024-06-10 NOTE — PROGRESS NOTES
SLP ALL NOTES  [facility]  Speech Language Pathology    Date: 6/10/2024  Patient Name: Chayito Navas  YOB: 1945   AGE: 78 y.o.  MRN: [id number]        Patient Not Available for Speech Therapy     Due to:  [] Testing  [] Hemodialysis  [] Cancelled by RN  [] Surgery   [] Intubation/Sedation/Pain Medication  [] Medical instability  [x] Other: Given history of dysphagia and GI concerns, recommend Modified Barium Swallow Study     Next scheduled treatment: as ordered  Completed by: Vanda Martínez, SLP, M.S. CCC-SLP

## 2024-06-11 ENCOUNTER — ANESTHESIA EVENT (OUTPATIENT)
Dept: OPERATING ROOM | Age: 79
End: 2024-06-11
Payer: MEDICARE

## 2024-06-11 ENCOUNTER — ANESTHESIA (OUTPATIENT)
Dept: OPERATING ROOM | Age: 79
End: 2024-06-11
Payer: MEDICARE

## 2024-06-11 ENCOUNTER — APPOINTMENT (OUTPATIENT)
Dept: MRI IMAGING | Age: 79
DRG: 056 | End: 2024-06-11
Payer: MEDICARE

## 2024-06-11 ENCOUNTER — APPOINTMENT (OUTPATIENT)
Dept: GENERAL RADIOLOGY | Age: 79
DRG: 056 | End: 2024-06-11
Payer: MEDICARE

## 2024-06-11 PROBLEM — R47.1 DYSARTHRIA: Status: ACTIVE | Noted: 2024-06-11

## 2024-06-11 LAB
ANION GAP SERPL CALCULATED.3IONS-SCNC: 10 MMOL/L (ref 9–16)
BASOPHILS # BLD: 0 K/UL (ref 0–0.2)
BASOPHILS NFR BLD: 0 % (ref 0–2)
BUN SERPL-MCNC: 9 MG/DL (ref 8–23)
CALCIUM SERPL-MCNC: 8 MG/DL (ref 8.6–10.4)
CHLORIDE SERPL-SCNC: 103 MMOL/L (ref 98–107)
CHOLEST SERPL-MCNC: 110 MG/DL (ref 0–199)
CHOLESTEROL/HDL RATIO: 3
CO2 SERPL-SCNC: 24 MMOL/L (ref 20–31)
CREAT SERPL-MCNC: 0.6 MG/DL (ref 0.5–0.9)
EOSINOPHIL # BLD: 0.1 K/UL (ref 0–0.44)
EOSINOPHILS RELATIVE PERCENT: 1 % (ref 1–4)
ERYTHROCYTE [DISTWIDTH] IN BLOOD BY AUTOMATED COUNT: 14.2 % (ref 11.8–14.4)
GFR, ESTIMATED: >90 ML/MIN/1.73M2
GLUCOSE BLD-MCNC: 175 MG/DL (ref 65–105)
GLUCOSE BLD-MCNC: 52 MG/DL (ref 65–105)
GLUCOSE BLD-MCNC: 70 MG/DL (ref 65–105)
GLUCOSE BLD-MCNC: 73 MG/DL (ref 65–105)
GLUCOSE BLD-MCNC: 92 MG/DL (ref 65–105)
GLUCOSE BLD-MCNC: 94 MG/DL (ref 65–105)
GLUCOSE BLD-MCNC: 95 MG/DL (ref 65–105)
GLUCOSE SERPL-MCNC: 69 MG/DL (ref 74–99)
HCT VFR BLD AUTO: 34.3 % (ref 36.3–47.1)
HDLC SERPL-MCNC: 35 MG/DL
HGB BLD-MCNC: 10.6 G/DL (ref 11.9–15.1)
IMM GRANULOCYTES # BLD AUTO: 0.1 K/UL (ref 0–0.3)
IMM GRANULOCYTES NFR BLD: 1 %
LDLC SERPL CALC-MCNC: 57 MG/DL (ref 0–100)
LYMPHOCYTES NFR BLD: 0.61 K/UL (ref 1.1–3.7)
LYMPHOCYTES RELATIVE PERCENT: 6 % (ref 24–43)
MCH RBC QN AUTO: 28 PG (ref 25.2–33.5)
MCHC RBC AUTO-ENTMCNC: 30.9 G/DL (ref 28.4–34.8)
MCV RBC AUTO: 90.7 FL (ref 82.6–102.9)
MONOCYTES NFR BLD: 0.71 K/UL (ref 0.1–1.2)
MONOCYTES NFR BLD: 7 % (ref 3–12)
MORPHOLOGY: NORMAL
NEUTROPHILS NFR BLD: 85 % (ref 36–65)
NEUTS SEG NFR BLD: 8.58 K/UL (ref 1.5–8.1)
NRBC BLD-RTO: 0 PER 100 WBC
PLATELET # BLD AUTO: 265 K/UL (ref 138–453)
PMV BLD AUTO: 9.6 FL (ref 8.1–13.5)
POTASSIUM SERPL-SCNC: 3.7 MMOL/L (ref 3.7–5.3)
RBC # BLD AUTO: 3.78 M/UL (ref 3.95–5.11)
SODIUM SERPL-SCNC: 137 MMOL/L (ref 136–145)
TRIGL SERPL-MCNC: 88 MG/DL
TROPONIN I SERPL HS-MCNC: 87 NG/L (ref 0–14)
VLDLC SERPL CALC-MCNC: 18 MG/DL
WBC OTHER # BLD: 10.1 K/UL (ref 3.5–11.3)

## 2024-06-11 PROCEDURE — 85025 COMPLETE CBC W/AUTO DIFF WBC: CPT

## 2024-06-11 PROCEDURE — 99233 SBSQ HOSP IP/OBS HIGH 50: CPT | Performed by: PSYCHIATRY & NEUROLOGY

## 2024-06-11 PROCEDURE — 2500000003 HC RX 250 WO HCPCS

## 2024-06-11 PROCEDURE — 2700000000 HC OXYGEN THERAPY PER DAY

## 2024-06-11 PROCEDURE — 3609013300 HC EGD TUBE PLACEMENT: Performed by: INTERNAL MEDICINE

## 2024-06-11 PROCEDURE — 6360000002 HC RX W HCPCS: Performed by: INTERNAL MEDICINE

## 2024-06-11 PROCEDURE — 6370000000 HC RX 637 (ALT 250 FOR IP): Performed by: INTERNAL MEDICINE

## 2024-06-11 PROCEDURE — 80061 LIPID PANEL: CPT

## 2024-06-11 PROCEDURE — 71045 X-RAY EXAM CHEST 1 VIEW: CPT

## 2024-06-11 PROCEDURE — 94761 N-INVAS EAR/PLS OXIMETRY MLT: CPT

## 2024-06-11 PROCEDURE — 2580000003 HC RX 258: Performed by: NURSE PRACTITIONER

## 2024-06-11 PROCEDURE — 7100000001 HC PACU RECOVERY - ADDTL 15 MIN: Performed by: INTERNAL MEDICINE

## 2024-06-11 PROCEDURE — 43246 EGD PLACE GASTROSTOMY TUBE: CPT | Performed by: INTERNAL MEDICINE

## 2024-06-11 PROCEDURE — 36415 COLL VENOUS BLD VENIPUNCTURE: CPT

## 2024-06-11 PROCEDURE — 82947 ASSAY GLUCOSE BLOOD QUANT: CPT

## 2024-06-11 PROCEDURE — 84484 ASSAY OF TROPONIN QUANT: CPT

## 2024-06-11 PROCEDURE — 80048 BASIC METABOLIC PNL TOTAL CA: CPT

## 2024-06-11 PROCEDURE — 1200000000 HC SEMI PRIVATE

## 2024-06-11 PROCEDURE — 2580000003 HC RX 258: Performed by: INTERNAL MEDICINE

## 2024-06-11 PROCEDURE — 2580000003 HC RX 258

## 2024-06-11 PROCEDURE — 0DH63UZ INSERTION OF FEEDING DEVICE INTO STOMACH, PERCUTANEOUS APPROACH: ICD-10-PCS | Performed by: INTERNAL MEDICINE

## 2024-06-11 PROCEDURE — 2580000003 HC RX 258: Performed by: STUDENT IN AN ORGANIZED HEALTH CARE EDUCATION/TRAINING PROGRAM

## 2024-06-11 PROCEDURE — 6360000002 HC RX W HCPCS

## 2024-06-11 PROCEDURE — 99233 SBSQ HOSP IP/OBS HIGH 50: CPT | Performed by: INTERNAL MEDICINE

## 2024-06-11 PROCEDURE — 2720000010 HC SURG SUPPLY STERILE: Performed by: INTERNAL MEDICINE

## 2024-06-11 PROCEDURE — 6360000002 HC RX W HCPCS: Performed by: STUDENT IN AN ORGANIZED HEALTH CARE EDUCATION/TRAINING PROGRAM

## 2024-06-11 PROCEDURE — 6360000002 HC RX W HCPCS: Performed by: NURSE PRACTITIONER

## 2024-06-11 PROCEDURE — 3E0G76Z INTRODUCTION OF NUTRITIONAL SUBSTANCE INTO UPPER GI, VIA NATURAL OR ARTIFICIAL OPENING: ICD-10-PCS | Performed by: INTERNAL MEDICINE

## 2024-06-11 PROCEDURE — 99232 SBSQ HOSP IP/OBS MODERATE 35: CPT | Performed by: INTERNAL MEDICINE

## 2024-06-11 PROCEDURE — 94640 AIRWAY INHALATION TREATMENT: CPT

## 2024-06-11 PROCEDURE — 7100000000 HC PACU RECOVERY - FIRST 15 MIN: Performed by: INTERNAL MEDICINE

## 2024-06-11 PROCEDURE — 3700000001 HC ADD 15 MINUTES (ANESTHESIA): Performed by: INTERNAL MEDICINE

## 2024-06-11 PROCEDURE — 3700000000 HC ANESTHESIA ATTENDED CARE: Performed by: INTERNAL MEDICINE

## 2024-06-11 RX ORDER — FENTANYL CITRATE 50 UG/ML
50 INJECTION, SOLUTION INTRAMUSCULAR; INTRAVENOUS
Status: DISCONTINUED | OUTPATIENT
Start: 2024-06-11 | End: 2024-06-18 | Stop reason: HOSPADM

## 2024-06-11 RX ORDER — METOPROLOL TARTRATE 1 MG/ML
5 INJECTION, SOLUTION INTRAVENOUS ONCE
Status: COMPLETED | OUTPATIENT
Start: 2024-06-11 | End: 2024-06-11

## 2024-06-11 RX ORDER — METOPROLOL TARTRATE 1 MG/ML
INJECTION, SOLUTION INTRAVENOUS
Status: COMPLETED
Start: 2024-06-11 | End: 2024-06-11

## 2024-06-11 RX ORDER — PROPOFOL 10 MG/ML
INJECTION, EMULSION INTRAVENOUS PRN
Status: DISCONTINUED | OUTPATIENT
Start: 2024-06-11 | End: 2024-06-11 | Stop reason: SDUPTHER

## 2024-06-11 RX ORDER — CEFAZOLIN SODIUM 1 G/3ML
INJECTION, POWDER, FOR SOLUTION INTRAMUSCULAR; INTRAVENOUS PRN
Status: DISCONTINUED | OUTPATIENT
Start: 2024-06-11 | End: 2024-06-11 | Stop reason: SDUPTHER

## 2024-06-11 RX ORDER — SODIUM CHLORIDE 0.9 % (FLUSH) 0.9 %
5-40 SYRINGE (ML) INJECTION EVERY 12 HOURS SCHEDULED
Status: DISCONTINUED | OUTPATIENT
Start: 2024-06-11 | End: 2024-06-11 | Stop reason: HOSPADM

## 2024-06-11 RX ORDER — SODIUM CHLORIDE, SODIUM LACTATE, POTASSIUM CHLORIDE, CALCIUM CHLORIDE 600; 310; 30; 20 MG/100ML; MG/100ML; MG/100ML; MG/100ML
INJECTION, SOLUTION INTRAVENOUS CONTINUOUS PRN
Status: DISCONTINUED | OUTPATIENT
Start: 2024-06-11 | End: 2024-06-11 | Stop reason: SDUPTHER

## 2024-06-11 RX ORDER — LABETALOL HYDROCHLORIDE 5 MG/ML
10 INJECTION, SOLUTION INTRAVENOUS
Status: DISCONTINUED | OUTPATIENT
Start: 2024-06-11 | End: 2024-06-11 | Stop reason: HOSPADM

## 2024-06-11 RX ORDER — SODIUM CHLORIDE 0.9 % (FLUSH) 0.9 %
5-40 SYRINGE (ML) INJECTION PRN
Status: DISCONTINUED | OUTPATIENT
Start: 2024-06-11 | End: 2024-06-11 | Stop reason: HOSPADM

## 2024-06-11 RX ORDER — ALBUTEROL SULFATE 2.5 MG/3ML
2.5 SOLUTION RESPIRATORY (INHALATION)
Status: DISCONTINUED | OUTPATIENT
Start: 2024-06-11 | End: 2024-06-11 | Stop reason: HOSPADM

## 2024-06-11 RX ORDER — LEVOFLOXACIN 5 MG/ML
750 INJECTION, SOLUTION INTRAVENOUS EVERY 24 HOURS
Status: COMPLETED | OUTPATIENT
Start: 2024-06-11 | End: 2024-06-13

## 2024-06-11 RX ORDER — DEXTROSE MONOHYDRATE AND SODIUM CHLORIDE 5; .9 G/100ML; G/100ML
INJECTION, SOLUTION INTRAVENOUS CONTINUOUS
Status: DISCONTINUED | OUTPATIENT
Start: 2024-06-11 | End: 2024-06-11

## 2024-06-11 RX ORDER — FENTANYL CITRATE 50 UG/ML
25 INJECTION, SOLUTION INTRAMUSCULAR; INTRAVENOUS EVERY 5 MIN PRN
Status: DISCONTINUED | OUTPATIENT
Start: 2024-06-11 | End: 2024-06-11 | Stop reason: HOSPADM

## 2024-06-11 RX ORDER — SODIUM CHLORIDE 9 MG/ML
INJECTION, SOLUTION INTRAVENOUS PRN
Status: DISCONTINUED | OUTPATIENT
Start: 2024-06-11 | End: 2024-06-11 | Stop reason: HOSPADM

## 2024-06-11 RX ORDER — HYDRALAZINE HYDROCHLORIDE 20 MG/ML
10 INJECTION INTRAMUSCULAR; INTRAVENOUS
Status: DISCONTINUED | OUTPATIENT
Start: 2024-06-11 | End: 2024-06-11 | Stop reason: HOSPADM

## 2024-06-11 RX ORDER — LIDOCAINE HYDROCHLORIDE 10 MG/ML
INJECTION, SOLUTION EPIDURAL; INFILTRATION; INTRACAUDAL; PERINEURAL PRN
Status: DISCONTINUED | OUTPATIENT
Start: 2024-06-11 | End: 2024-06-11 | Stop reason: SDUPTHER

## 2024-06-11 RX ORDER — FENTANYL CITRATE 50 UG/ML
50 INJECTION, SOLUTION INTRAMUSCULAR; INTRAVENOUS EVERY 5 MIN PRN
Status: DISCONTINUED | OUTPATIENT
Start: 2024-06-11 | End: 2024-06-11 | Stop reason: HOSPADM

## 2024-06-11 RX ORDER — MIDAZOLAM HYDROCHLORIDE 2 MG/2ML
2 INJECTION, SOLUTION INTRAMUSCULAR; INTRAVENOUS
Status: DISCONTINUED | OUTPATIENT
Start: 2024-06-11 | End: 2024-06-11 | Stop reason: HOSPADM

## 2024-06-11 RX ORDER — PROCHLORPERAZINE EDISYLATE 5 MG/ML
5 INJECTION INTRAMUSCULAR; INTRAVENOUS
Status: DISCONTINUED | OUTPATIENT
Start: 2024-06-11 | End: 2024-06-11 | Stop reason: HOSPADM

## 2024-06-11 RX ORDER — FENTANYL CITRATE 50 UG/ML
25 INJECTION, SOLUTION INTRAMUSCULAR; INTRAVENOUS ONCE
Status: COMPLETED | OUTPATIENT
Start: 2024-06-11 | End: 2024-06-11

## 2024-06-11 RX ORDER — SODIUM CHLORIDE 9 MG/ML
INJECTION, SOLUTION INTRAVENOUS CONTINUOUS
Status: DISCONTINUED | OUTPATIENT
Start: 2024-06-11 | End: 2024-06-12

## 2024-06-11 RX ORDER — NALOXONE HYDROCHLORIDE 0.4 MG/ML
INJECTION, SOLUTION INTRAMUSCULAR; INTRAVENOUS; SUBCUTANEOUS PRN
Status: DISCONTINUED | OUTPATIENT
Start: 2024-06-11 | End: 2024-06-11 | Stop reason: HOSPADM

## 2024-06-11 RX ORDER — FENTANYL CITRATE 50 UG/ML
100 INJECTION, SOLUTION INTRAMUSCULAR; INTRAVENOUS
Status: DISCONTINUED | OUTPATIENT
Start: 2024-06-11 | End: 2024-06-11 | Stop reason: HOSPADM

## 2024-06-11 RX ORDER — IPRATROPIUM BROMIDE AND ALBUTEROL SULFATE 2.5; .5 MG/3ML; MG/3ML
1 SOLUTION RESPIRATORY (INHALATION)
Status: DISCONTINUED | OUTPATIENT
Start: 2024-06-11 | End: 2024-06-11 | Stop reason: HOSPADM

## 2024-06-11 RX ORDER — METOCLOPRAMIDE HYDROCHLORIDE 5 MG/ML
10 INJECTION INTRAMUSCULAR; INTRAVENOUS
Status: DISCONTINUED | OUTPATIENT
Start: 2024-06-11 | End: 2024-06-11 | Stop reason: HOSPADM

## 2024-06-11 RX ADMIN — ALBUTEROL SULFATE 2 PUFF: 90 AEROSOL, METERED RESPIRATORY (INHALATION) at 17:08

## 2024-06-11 RX ADMIN — DEXTROSE MONOHYDRATE 125 ML: 100 INJECTION, SOLUTION INTRAVENOUS at 12:44

## 2024-06-11 RX ADMIN — DEXTROSE AND SODIUM CHLORIDE: 5; 900 INJECTION, SOLUTION INTRAVENOUS at 09:06

## 2024-06-11 RX ADMIN — PROPOFOL 30 MG: 10 INJECTION, EMULSION INTRAVENOUS at 11:17

## 2024-06-11 RX ADMIN — SODIUM CHLORIDE: 9 INJECTION, SOLUTION INTRAVENOUS at 23:26

## 2024-06-11 RX ADMIN — METOPROLOL TARTRATE 5 MG: 5 INJECTION INTRAVENOUS at 16:42

## 2024-06-11 RX ADMIN — ATORVASTATIN CALCIUM 20 MG: 20 TABLET, FILM COATED ORAL at 21:23

## 2024-06-11 RX ADMIN — WATER 40 MG: 1 INJECTION INTRAMUSCULAR; INTRAVENOUS; SUBCUTANEOUS at 17:05

## 2024-06-11 RX ADMIN — FENTANYL CITRATE 50 MCG: 50 INJECTION, SOLUTION INTRAMUSCULAR; INTRAVENOUS at 21:22

## 2024-06-11 RX ADMIN — DEXTROSE MONOHYDRATE 125 ML: 100 INJECTION, SOLUTION INTRAVENOUS at 08:07

## 2024-06-11 RX ADMIN — BUDESONIDE AND FORMOTEROL FUMARATE DIHYDRATE 2 PUFF: 80; 4.5 AEROSOL RESPIRATORY (INHALATION) at 19:38

## 2024-06-11 RX ADMIN — HEPARIN SODIUM 5000 UNITS: 5000 INJECTION INTRAVENOUS; SUBCUTANEOUS at 15:10

## 2024-06-11 RX ADMIN — SODIUM CHLORIDE: 9 INJECTION, SOLUTION INTRAVENOUS at 04:38

## 2024-06-11 RX ADMIN — CEFAZOLIN 2 G: 1 INJECTION, POWDER, FOR SOLUTION INTRAMUSCULAR; INTRAVENOUS at 11:18

## 2024-06-11 RX ADMIN — FENTANYL CITRATE 25 MCG: 50 INJECTION, SOLUTION INTRAMUSCULAR; INTRAVENOUS at 15:10

## 2024-06-11 RX ADMIN — ONDANSETRON 4 MG: 2 INJECTION INTRAMUSCULAR; INTRAVENOUS at 16:04

## 2024-06-11 RX ADMIN — SODIUM CHLORIDE, POTASSIUM CHLORIDE, SODIUM LACTATE AND CALCIUM CHLORIDE: 600; 310; 30; 20 INJECTION, SOLUTION INTRAVENOUS at 11:12

## 2024-06-11 RX ADMIN — METOPROLOL TARTRATE 5 MG: 1 INJECTION, SOLUTION INTRAVENOUS at 16:42

## 2024-06-11 RX ADMIN — LIDOCAINE HYDROCHLORIDE 50 MG: 10 INJECTION, SOLUTION EPIDURAL; INFILTRATION; INTRACAUDAL; PERINEURAL at 11:16

## 2024-06-11 RX ADMIN — PROPOFOL 30 MG: 10 INJECTION, EMULSION INTRAVENOUS at 11:20

## 2024-06-11 RX ADMIN — LEVOFLOXACIN 750 MG: 5 INJECTION, SOLUTION INTRAVENOUS at 17:54

## 2024-06-11 RX ADMIN — HEPARIN SODIUM 5000 UNITS: 5000 INJECTION INTRAVENOUS; SUBCUTANEOUS at 21:30

## 2024-06-11 RX ADMIN — PROPOFOL 25 MCG/KG/MIN: 10 INJECTION, EMULSION INTRAVENOUS at 11:21

## 2024-06-11 RX ADMIN — BUDESONIDE AND FORMOTEROL FUMARATE DIHYDRATE 2 PUFF: 80; 4.5 AEROSOL RESPIRATORY (INHALATION) at 08:14

## 2024-06-11 RX ADMIN — METOPROLOL TARTRATE 25 MG: 25 TABLET, FILM COATED ORAL at 21:23

## 2024-06-11 ASSESSMENT — PAIN DESCRIPTION - LOCATION
LOCATION: ABDOMEN
LOCATION: ABDOMEN

## 2024-06-11 ASSESSMENT — PAIN - FUNCTIONAL ASSESSMENT
PAIN_FUNCTIONAL_ASSESSMENT: PREVENTS OR INTERFERES SOME ACTIVE ACTIVITIES AND ADLS
PAIN_FUNCTIONAL_ASSESSMENT: NONE - DENIES PAIN

## 2024-06-11 ASSESSMENT — PAIN DESCRIPTION - DESCRIPTORS: DESCRIPTORS: ACHING;CRAMPING

## 2024-06-11 ASSESSMENT — PAIN SCALES - GENERAL
PAINLEVEL_OUTOF10: 5
PAINLEVEL_OUTOF10: 5
PAINLEVEL_OUTOF10: 7

## 2024-06-11 ASSESSMENT — PAIN DESCRIPTION - PAIN TYPE: TYPE: SURGICAL PAIN

## 2024-06-11 ASSESSMENT — PAIN DESCRIPTION - ORIENTATION: ORIENTATION: MID

## 2024-06-11 NOTE — PROGRESS NOTES
Cardiology Progress Note                     Date:   6/11/2024  Patient name: Chayito Navsa  Date of admission:  6/9/2024  1:32 PM  MRN:   [id number]  YOB: 1945  PCP: Brijesh Perez MD    Reason for Admission:  progressive dysphagia     Subjective:       There were no acute events overnight, remained hemodynamically stable, denies chest pain, dyspnea, orthopnea or palpitations. Ambulating to bathroom with no dizziness. HS troponin trending down.       Scheduled Meds:   budesonide-formoterol  2 puff Inhalation BID RT    pantoprazole  40 mg Oral QAM AC    aspirin  81 mg Oral Daily    atorvastatin  20 mg Oral Nightly    heparin (porcine)  5,000 Units SubCUTAneous 3 times per day    sodium chloride flush  5-40 mL IntraVENous 2 times per day       Continuous Infusions:   dextrose      dextrose      sodium chloride 100 mL/hr at 06/11/24 0534    sodium chloride         Labs:     CBC:   Recent Labs     06/09/24  2104 06/10/24  0558 06/11/24  0656   WBC 9.8 9.5 10.1   HGB 11.1* 10.5* 10.6*    308 265     BMP:    Recent Labs     06/09/24  1354 06/10/24  0558    138   K 3.6* 3.6*   CL 96* 102   CO2 24 22   BUN 24* 14   CREATININE 0.9 0.7   GLUCOSE 77 63*     Hepatic:   Recent Labs     06/09/24  1354   AST 35   ALT <5*   BILITOT 0.3   ALKPHOS 66     Troponin: No results for input(s): \"TROPONINI\" in the last 72 hours.  BNP: No results for input(s): \"BNP\" in the last 72 hours.  Lipids: No results for input(s): \"CHOL\", \"HDL\" in the last 72 hours.    Invalid input(s): \"LDLCALCU\"  INR:   Recent Labs     06/09/24  2104   INR 1.1         Objective:     Vitals: /60   Pulse 84   Temp 98.1 °F (36.7 °C) (Oral)   Resp 19   Ht 1.49 m (4' 10.66\")   Wt 39.5 kg (87 lb)   SpO2 99%   BMI 17.78 kg/m²     General appearance: awake, alert, in no apparent respiratory distress on room air    HEENT: Head: Normocephalic, no lesions, without obvious abnormality  Neck: no JVD  Lungs: clear to

## 2024-06-11 NOTE — PROGRESS NOTES
[facility]  Office: [phone]  Dario Verma DO, Dustin Amador DO, Nathan De La Cruz DO, Juarez Su DO, April Zamorano MD, Erlinda Sanchez MD, Everette Harden MD, Bridget Rowe MD,  Celestino Smith MD, Arianna Lopes MD, Kade Joaquin MD,  Brigette Almonte DO, Kolton Linder MD, Vijay Childers MD, Timothy Verma DO, Jenn Camp MD,  Lawson Diallo DO, Lucy Liriano MD, Allyn Kruger MD, Loulou Resendiz MD, Luz Jordan MD,  Dick Stock MD, Shanon Ugalde MD, Vy Armendariz MD, Brennan Yo MD, Ramiro Kent MD, Alan Thompson MD, Niko King DO, Roberto Bhatt DO, Gianfranco Henriquez MD,  Fernandez Tilley MD, Shirley Waterhouse, CNP,  Yoanna Medina CNP, Juarez Webb, CNP,  Merary Crespo, DNP, Ilda Jean-Baptiste, CNP, Milagros Messina, CNP, Florence Saenz, CNP, Cristina Belle, CNP, Devika Olvera, PA-C, Mary Kirkpatrick PA-C, Cherry Watson, CNP, Erma Cifuentes, CNP, Benny Marie, CNP, Pebbles Proctor, CNP, Holly Jones, CNP, Altagracia Iglesias, CNS, Shannan Cruz, CNP, Erika Velez CNP, Tracy Schwab, CNP         [facility]   IN-PATIENT SERVICE   [facility]    Progress Note    6/11/2024    12:54 PM    Name:   Chayito Navas  MRN:     [id number]     Acct:      [id number]   Room:   0535/0535-01   Day:  2  Admit Date:  6/9/2024  1:32 PM    PCP:   Brijesh Perez MD  Code Status:  Full Code    Subjective:     C/C:   Chief Complaint   Patient presents with    Dysphagia     Interval History Status: not changed.     06/11/2024  Patient had PEG placed today. Will start TF later today.  Cardiology notes reviewed.  On asa/statin. BB to be restarted today.  Neurology has ordered MRI Brain and C spine.  ENT has performed laryngoscopy with obvious cause for dysphagia.    06/10/2024  Patient has no changes since last night. Having difficulty swallowing. She is on IV fluids. She has no chest pain. Trops were elevated but flat.  She has long history of dysphagia and  GI and cardiology are consulted.     Of note  She was recently seen in [place] in April for evaluation of dysphagia. Her previous testing includes abnormal swallow study by SLP which showed smal narrowing at lower cervical upper thoracic and cervical level, small hiatal hernia with Schatzki's ring and esophageal dysmotility.  An EGD 9/23 showed GERD, esophageal dysmotility, esophageal narrowing and Schatzki's rings was dilated. Swallow motility function test showed substantial delay of the entire swallowing process as well as stasis of content in the vallecula and proximal esophagus. Manometry 1/15 was normal with good motility.  Manometry 1/24 showed normal good motility.  Her myasthenia gravis screen was negative.  She was recommended EGD with bougie dilatation and for that she was referred to [facility] at [place] underwent EGD/Dil in 5/24 without any improvement..      Review of Systems:     Review of Systems   Constitutional:  Positive for fatigue and unexpected weight change. Negative for chills and fever.   HENT:  Positive for drooling, trouble swallowing and voice change. Negative for sore throat.    Eyes:  Negative for photophobia and visual disturbance.   Respiratory:  Negative for cough, shortness of breath and wheezing.    Cardiovascular:  Negative for chest pain, palpitations and leg swelling.   Gastrointestinal:  Negative for abdominal pain, blood in stool, diarrhea and nausea.   Neurological:  Negative for dizziness, tremors, seizures, syncope, weakness, numbness and headaches.       Medications:     Allergies:    Allergies   Allergen Reactions    Pcn [Penicillins] Rash       Current Meds:   Scheduled Meds:    budesonide-formoterol  2 puff Inhalation BID RT    pantoprazole  40 mg Oral QAM AC    aspirin  81 mg Oral Daily    atorvastatin  20 mg Oral Nightly    heparin (porcine)  5,000 Units SubCUTAneous 3 times per day    sodium chloride flush  5-40 mL IntraVENous 2 times per day     Continuous

## 2024-06-11 NOTE — PLAN OF CARE
Problem: Discharge Planning  Goal: Discharge to home or other facility with appropriate resources  6/10/2024 2308 by Maddy Nicholson RN  Outcome: Progressing  6/10/2024 2307 by Maddy Nicholson RN  Outcome: Progressing     Problem: Pain  Goal: Verbalizes/displays adequate comfort level or baseline comfort level  6/10/2024 2308 by Maddy Nicholson RN  Outcome: Progressing  6/10/2024 2307 by Maddy Nicholson RN  Outcome: Progressing     Problem: ABCDS Injury Assessment  Goal: Absence of physical injury  6/10/2024 2308 by Maddy Nicholson RN  Outcome: Progressing  6/10/2024 2307 by Maddy Nicholson RN  Outcome: Progressing     Problem: Skin/Tissue Integrity  Goal: Absence of new skin breakdown  Description: 1.  Monitor for areas of redness and/or skin breakdown  2.  Assess vascular access sites hourly  3.  Every 4-6 hours minimum:  Change oxygen saturation probe site  4.  Every 4-6 hours:  If on nasal continuous positive airway pressure, respiratory therapy assess nares and determine need for appliance change or resting period.  6/10/2024 2308 by Maddy Nicholson RN  Outcome: Progressing  6/10/2024 2307 by Maddy Nicholson RN  Outcome: Progressing     Problem: Nutrition Deficit:  Goal: Optimize nutritional status  6/10/2024 2308 by Maddy Nicholson RN  Outcome: Progressing  6/10/2024 2307 by Maddy Nicholson RN  Outcome: Progressing    Patient is NPO. She is currently on IV fluids for hydration .      Problem: Safety - Adult  Goal: Free from fall injury  6/10/2024 2308 by Maddy Nicholson RN  Outcome: Progressing  6/10/2024 2307 by Maddy Nicholson RN  Outcome: Progressing

## 2024-06-11 NOTE — OP NOTE
Esophagogastroduodenoscopy (EGD) + PEG Placement Procedure Note    Procedure:  EGD with PEG tube placement    Indications:  Malnutrition, weight loss, lack of appetitie    Sedation:  MAC    Procedure Date: 6/11/2024    Attending Physician:  Dr. Stephanie Escalona MD    Assistant:  None    Procedure Details:    Informed consent was obtained for the procedure, including sedation. Risks of infection, perforation, hemorrhage, adverse drug reaction, and aspiration were discussed. The patient was placed in the left lateral decubitus position. The patient was monitored continuously with ECG tracing, pulse oximetry, blood pressure monitoring, and direct observation.      The gastroscope was inserted into the mouth and advanced under direct vision to second portion of the duodenum.  A careful inspection was made as the gastroscope was withdrawn, including a retroflexed view of the proximal stomach; findings and interventions are described below. Appropriate photodocumentation was obtained.    Findings:  Few scattered erosions noted in the stomach body    The stomach was transilluminated and an optimal position for the PEG tube was identified using the single poke method. The skin was infiltrated with local and the needle and sheath were inserted through the abdomen into the stomach under direct visualization. The needle was removed and a guidewire was inserted through the sheath. The guidewire was grasped from above with a snare. It was removed completely and the 20 Fr PEG tube was secured to the guidewire.     The guidewire and PEG tube were then pulled through the mouth and esophagus and snug to the abdominal wall. There was no evidence of bleeding. Photos were taken. The Bolster was placed on the PEG site. The external marking was noted at 3 cm. The patient tolerated the procedure well and was transferred to recovery room in stable condition.       Complications:  None           Estimated blood loss:   Minimal    Disposition:  Home           Condition: stable    Specimen Removed: None    Impression:    Mild erosive gastropathy  Successful placement of 20 Fr PEG tube endoscopically using pull method.     Recommendations:  Protonix 40mg daily for 2 months  Okay to use PEG tube for medications and flush immediately.  Start tube feeds in 6 hours.  Nutrition consult for Tube feeding recommendations.  Avoid anticoagulation for 3 days if okay with prescribing physician.    Attending Attestation: I performed the procedure.    Stephanie Escalona MD

## 2024-06-11 NOTE — ANESTHESIA PRE PROCEDURE
Department of Anesthesiology  Preprocedure Note       Name:  Chayito Navas   Age:  78 y.o.  :  1945                                          MRN:  [id number]         Date:  2024      Surgeon: Surgeon(s):  Stephanie Escalona MD    Procedure: Procedure(s):  ** ADD ON**ESOPHAGOGASTRODUODENOSCOPY PERCUTANEOUS ENDOSCOPIC GASTROSTOMY TUBE INSERTION FOR DYSPHAGIA AND WEIGHTLOSS    Medications prior to admission:   Prior to Admission medications    Medication Sig Start Date End Date Taking? Authorizing Provider   fluticasone furoate-vilanterol (BREO ELLIPTA) 200-25 MCG/ACT AEPB inhaler Inhale 1 puff into the lungs daily   Yes ProviderMonica MD   omeprazole (PRILOSEC) 20 MG delayed release capsule Take 1 capsule by mouth 2 times daily Before meals   Yes Provider, MD Monica   vitamin D (D3) 25 MCG (1000 UT) CAPS Take 1 capsule by mouth daily   Yes Provider, MD Monica   atenolol (TENORMIN) 50 MG tablet Take 1 tablet by mouth daily   Yes ProviderMonica MD   albuterol sulfate HFA (VENTOLIN HFA) 108 (90 Base) MCG/ACT inhaler Inhale 2 puffs into the lungs every 6 hours as needed for Wheezing   Yes Provider, MD Monica   ipratropium 0.5 mg-albuterol 2.5 mg (DUONEB) 0.5-2.5 (3) MG/3ML SOLN nebulizer solution Take 3 mLs by nebulization every 4 hours as needed for Shortness of Breath   Yes Provider, MD Monica       Current medications:    Current Facility-Administered Medications   Medication Dose Route Frequency Provider Last Rate Last Admin    albuterol sulfate HFA (PROVENTIL;VENTOLIN;PROAIR) 108 (90 Base) MCG/ACT inhaler 2 puff  2 puff Inhalation Q6H PRN Lucy Liriano MD        budesonide-formoterol (SYMBICORT) 80-4.5 MCG/ACT inhaler 2 puff  2 puff Inhalation BID RT Lucy Liriano MD        pantoprazole (PROTONIX) tablet 40 mg  40 mg Oral QAM AC uLcy Liriano MD        aspirin chewable tablet 81 mg  81 mg Oral Daily Lucy Liriano MD   81 mg at 06/10/24 1601    atorvastatin

## 2024-06-11 NOTE — PROGRESS NOTES
[facility] Neurology   IN-PATIENT SERVICE   [facility]    Progress Note             Date:   6/11/2024  Patient name:  Chayito Navas  Date of admission:  6/9/2024  1:32 PM  MRN:   [id number]  Account:  [id number]  YOB: 1945  PCP:    Brijesh Perez MD  Room:   [facility]/NONE  Code Status:    Full Code    Chief Complaint:     Chief Complaint   Patient presents with    Dysphagia       Interval hx:     The patient was seen and examined at bedside. Is vitally stable, alert and oriented x 3.     On exam, patient is noted to some tongue atrophy, but is able to move her tongue. She has full strength BL upper and lower extremities. She denies weakness with head movement.     PEG today.     Brief History of Present Illness:     78-year-old female with past medical history interstitial lung disease, RA, HTN, asthma, colon cancer, esophageal Schatzki's ring who presents with a 6-month history of progressive dysphagia.  The patient's dysphagia has advanced from solids to now including liquids.  She had prior extensive workup with GI including multiple EGD with dilations of Schatzki's ring.  Esophageal manometry was normal.  Modified barium swallow October 2023 showed substantial delay of entire swallowing process, no aspiration.  The patient is also being treated with heparin drip for NSTEMI.  Neurology consulted for dysphagia.  On exam, the patient is noted to have a plegic tongue with atrophy, no fasciculations, absent gag reflex, right upper extremity weakness, bilateral upper extremity hyperreflexia, and bilateral lower extremity hyperreflexia.     Per patient, she has unexpected weight change.  She also notes a voice change.    Interval history:  Repeat myasthenia panel pending  MRI brain and C-spine pending  CRP 35, ESR 84, DARREN pending, ACE pending  Patient getting PEG tube today    Past Medical History:     History reviewed. No pertinent past medical history.     Past Surgical   BMI 17.78 kg/m²   Temp (24hrs), Av.9 °F (36.6 °C), Min:97 °F (36.1 °C), Max:98.7 °F (37.1 °C)    Recent Labs     06/10/24  2222 24  0058 24  0427 24  0845   POCGLU 115* 73 70 94       Intake/Output Summary (Last 24 hours) at 2024 1154  Last data filed at 2024 1135  Gross per 24 hour   Intake 3006.02 ml   Output 1700 ml   Net 1306.02 ml         Neurologic Exam     GENERAL  Appears comfortable and in no distress   HEENT  NC/ AT   HEART  S1 and S2 heard; palpation of pulses: radial pulse    NECK  Supple and no bruits heard   MENTAL STATUS:  Alert, oriented, intact memory, no confusion, normal speech, normal language, no hallucination or delusion   CRANIAL NERVES: II     -      Visual fields intact to confrontation  III,IV,VI -  PERR, EOMs full, no ptosis  V     -     Normal facial sensation   VII    -     Normal facial symmetry  VIII   -     Intact hearing   IX,X -     Symmetrical palate  XI    -     Symmetrical shoulder shrug  XII   -     Midline tongue, full range movement; mild atrophy   MOTOR FUNCTION: RUE: Significant for good strength of grade 5/5 in proximal and distal muscle groups   LUE: Significant for good strength of grade 5/5 in proximal and distal muscle groups   RLE: Significant for good strength of grade 5/5 in proximal and distal muscle groups   LLE: Significant for good strength of grade 5/5 in proximal and distal muscle groups      Normal bulk, normal tone and no involuntary movements, no tremor   SENSORY FUNCTION:  Normal touch, normal pinprick, normal vibration, normal proprioception   CEREBELLAR FUNCTION:  Intact fine motor control over upper limbs and lower limbs   REFLEX FUNCTION:  Bilateral upper extremities 2+  RLE 2+   LLE 1+    STATION and GAIT Deferred       Investigations:      Laboratory Testing:  Recent Results (from the past 24 hour(s))   Anti-Xa, Unfractionated Heparin    Collection Time: 06/10/24 12:13 PM   Result Value Ref Range    Anti-XA Unfrac

## 2024-06-11 NOTE — ANESTHESIA POSTPROCEDURE EVALUATION
Department of Anesthesiology  Postprocedure Note    Patient: Chayito Navas  MRN: [id number]  YOB: 1945  Date of evaluation: 6/11/2024    Procedure Summary       Date: 06/11/24 Room / Location: [address]    Anesthesia Start: 1112 Anesthesia Stop: 1147    Procedure: ** ADD ON**ESOPHAGOGASTRODUODENOSCOPY PERCUTANEOUS ENDOSCOPIC GASTROSTOMY TUBE INSERTION FOR DYSPHAGIA AND WEIGHTLOSS Diagnosis:       Dysphagia, unspecified type      (Dysphagia, unspecified type [R13.10])    Surgeons: Stephanie Escalona MD Responsible Provider: Simon Gibbons MD    Anesthesia Type: MAC ASA Status: 4            Anesthesia Type: MAC    Jorge Luis Phase I:      Jorge Luis Phase II:      Anesthesia Post Evaluation    Patient location during evaluation: bedside  Patient participation: complete - patient participated  Level of consciousness: awake  Airway patency: patent  Nausea & Vomiting: no nausea and no vomiting  Cardiovascular status: blood pressure returned to baseline  Respiratory status: acceptable  Hydration status: euvolemic  Comments: BP (!) 158/72   Pulse 71   Temp 97.7 °F (36.5 °C) (Temporal)   Resp 17   Ht 1.49 m (4' 10.66\")   Wt 39.5 kg (87 lb)   SpO2 100%   BMI 17.78 kg/m²     Pain management: adequate    No notable events documented.

## 2024-06-11 NOTE — PLAN OF CARE
Patient seen and examined. Had PEG tube placement today, aftewrads had emesis. Upper airway stridor and expiratory wheezing noted with rhonchi worse in the RLL. Solumedrol IV 40 mg q 8 hours 3 doses, will start Levaquin due to penicillin allergy, check stat chest x-ray.

## 2024-06-11 NOTE — FLOWSHEET NOTE
Page sent to Dr. Liriano to inform of patient c/o nausea and abdominal pain. Patient had episode of emesis and is wanting to hold off on MRI at this time. Call placed to MRI and informed of patient not feeling well.    06/11/24 1608   Vital Signs   Pulse (!) 124   Respirations 25   BP (!) 185/99   MAP (Calculated) 128   MAP (mmHg) 122     Electronically signed by CLAUDIO YEUNG RN on 6/11/2024 at 4:16 PM

## 2024-06-11 NOTE — PLAN OF CARE
Problem: Discharge Planning  Goal: Discharge to home or other facility with appropriate resources  Outcome: Progressing     Problem: Pain  Goal: Verbalizes/displays adequate comfort level or baseline comfort level  Outcome: Progressing     Problem: ABCDS Injury Assessment  Goal: Absence of physical injury  Outcome: Progressing     Problem: Skin/Tissue Integrity  Goal: Absence of new skin breakdown  Description: 1.  Monitor for areas of redness and/or skin breakdown  2.  Assess vascular access sites hourly  3.  Every 4-6 hours minimum:  Change oxygen saturation probe site  4.  Every 4-6 hours:  If on nasal continuous positive airway pressure, respiratory therapy assess nares and determine need for appliance change or resting period.  Outcome: Progressing     Problem: Nutrition Deficit:  Goal: Optimize nutritional status  Outcome: Progressing  Flowsheets (Taken 6/11/2024 1405 by Maryana Perez RD)  Nutrient intake appropriate for improving, restoring, or maintaining nutritional needs:   Assess nutritional status and recommend course of action   Monitor oral intake, labs, and treatment plans   Recommend, monitor, and adjust tube feedings and TPN/PPN based on assessed needs   Recommend appropriate diets, oral nutritional supplements, and vitamin/mineral supplements     Problem: Safety - Adult  Goal: Free from fall injury  Outcome: Progressing   Electronically signed by CLAUDIO YEUNG RN on 6/11/2024 at 3:16 PM

## 2024-06-11 NOTE — PROGRESS NOTES
Page sent to Dr. Liriano, patient is having difficulty clearing secretions and is desaturating. Now on 6L NC, Dr. Joaquin to evaluate patient, orders received.   Electronically signed by CLAUDIO YEUNG RN on 6/11/2024

## 2024-06-11 NOTE — CONSULTS
Comprehensive Nutrition Assessment    Type and Reason for Visit:  Reassess, Consult    Nutrition Recommendations/Plan:   For tube feeding, recommend Standard with Fiber (Jevity 1.5) with goal rate of 40 ml/hr (1440 kcals, 61 gm protein). If Jevity 1.2 is used, recommend goal rate of 45 ml/hr (1296 kcals, 60 gm protein). Recommend initiating TF at 110 ml/hr, and advance rate towards goal as tolerated.  To monitor nutrition intake/tolerance, labs, weight, and plan of care.     Malnutrition Assessment:  Malnutrition Status:  Severe malnutrition (06/10/24 0946)    Context:  Chronic Illness     Findings of the 6 clinical characteristics of malnutrition:  Energy Intake:  75% or less estimated energy requirements for 1 month or longer  Weight Loss:  Greater than 10% over 6 months     Body Fat Loss:  Severe body fat loss Triceps, Orbital, Buccal region   Muscle Mass Loss:  Severe muscle mass loss Temples (temporalis), Clavicles (pectoralis & deltoids), Hand (interosseous)  Fluid Accumulation:  No significant fluid accumulation     Strength:  Not Performed    Nutrition Assessment:    Received order for tube feed recommendation and management. Per chart, patient s/p PEG placement today, and plan to start tube feeds today. Labs/meds reviewed,    Nutrition Related Findings:    Meds/Labs reviewed.  No wounds/edema noted Wound Type: None       Current Nutrition Intake & Therapies:    Average Meal Intake: NPO  Average Supplements Intake: NPO  Diet NPO    Anthropometric Measures:  Height: 149 cm (4' 10.66\")  Ideal Body Weight (IBW): 93 lbs (42 kg)    Admission Body Weight: 40 kg (88 lb 2.9 oz)  Current Body Weight: 39.8 kg (87 lb 11.9 oz), 92.4 % IBW. Weight Source: Bed Scale  Current BMI (kg/m2): 17.7  Usual Body Weight: 67 kg (147 lb 11.3 oz) (based on 60# wt loss)  % Weight Change (Calculated): -40.6  Weight Adjustment For: No Adjustment                 BMI Categories: Underweight (BMI less than 18.5)    Estimated Daily

## 2024-06-12 ENCOUNTER — APPOINTMENT (OUTPATIENT)
Dept: MRI IMAGING | Age: 79
DRG: 056 | End: 2024-06-12
Payer: MEDICARE

## 2024-06-12 PROBLEM — J69.0 ASPIRATION PNEUMONIA OF LEFT LOWER LOBE DUE TO VOMIT (HCC): Status: ACTIVE | Noted: 2024-06-12

## 2024-06-12 PROBLEM — Z93.1 S/P PERCUTANEOUS ENDOSCOPIC GASTROSTOMY (PEG) TUBE PLACEMENT (HCC): Status: ACTIVE | Noted: 2024-06-12

## 2024-06-12 PROBLEM — G12.22: Status: ACTIVE | Noted: 2024-06-12

## 2024-06-12 LAB
ANA SER QL IA: POSITIVE
ANION GAP SERPL CALCULATED.3IONS-SCNC: 8 MMOL/L (ref 9–16)
BASOPHILS # BLD: 0 K/UL (ref 0–0.2)
BASOPHILS NFR BLD: 0 % (ref 0–2)
BUN SERPL-MCNC: 10 MG/DL (ref 8–23)
CALCIUM SERPL-MCNC: 8 MG/DL (ref 8.6–10.4)
CHLORIDE SERPL-SCNC: 105 MMOL/L (ref 98–107)
CO2 SERPL-SCNC: 25 MMOL/L (ref 20–31)
CREAT SERPL-MCNC: 0.6 MG/DL (ref 0.5–0.9)
DSDNA IGG SER QL IA: 1.4 IU/ML
ENA SCL70 AB SER IA-ACNC: <0.6 U/ML
EOSINOPHIL # BLD: 0 K/UL (ref 0–0.4)
EOSINOPHILS RELATIVE PERCENT: 0 % (ref 1–4)
ERYTHROCYTE [DISTWIDTH] IN BLOOD BY AUTOMATED COUNT: 13.9 % (ref 11.8–14.4)
GFR, ESTIMATED: >90 ML/MIN/1.73M2
GLUCOSE BLD-MCNC: 140 MG/DL (ref 65–105)
GLUCOSE BLD-MCNC: 182 MG/DL (ref 65–105)
GLUCOSE SERPL-MCNC: 199 MG/DL (ref 74–99)
HCT VFR BLD AUTO: 36.7 % (ref 36.3–47.1)
HGB BLD-MCNC: 11.2 G/DL (ref 11.9–15.1)
IMM GRANULOCYTES # BLD AUTO: 0.15 K/UL (ref 0–0.3)
IMM GRANULOCYTES NFR BLD: 1 %
LYMPHOCYTES NFR BLD: 0.3 K/UL (ref 1–4.8)
LYMPHOCYTES RELATIVE PERCENT: 2 % (ref 24–44)
MCH RBC QN AUTO: 27.5 PG (ref 25.2–33.5)
MCHC RBC AUTO-ENTMCNC: 30.5 G/DL (ref 28.4–34.8)
MCV RBC AUTO: 90.2 FL (ref 82.6–102.9)
MONOCYTES NFR BLD: 0.15 K/UL (ref 0.1–0.8)
MONOCYTES NFR BLD: 1 % (ref 1–7)
MORPHOLOGY: NORMAL
NEUTROPHILS NFR BLD: 96 % (ref 36–66)
NEUTS SEG NFR BLD: 14.6 K/UL (ref 1.8–7.7)
NRBC BLD-RTO: 0 PER 100 WBC
NUCLEAR IGG SER IA-RTO: 8.3 U/ML
PLATELET # BLD AUTO: 278 K/UL (ref 138–453)
PMV BLD AUTO: 9.7 FL (ref 8.1–13.5)
POTASSIUM SERPL-SCNC: 3.9 MMOL/L (ref 3.7–5.3)
RBC # BLD AUTO: 4.07 M/UL (ref 3.95–5.11)
SODIUM SERPL-SCNC: 138 MMOL/L (ref 136–145)
WBC OTHER # BLD: 15.2 K/UL (ref 3.5–11.3)

## 2024-06-12 PROCEDURE — 85025 COMPLETE CBC W/AUTO DIFF WBC: CPT

## 2024-06-12 PROCEDURE — 94761 N-INVAS EAR/PLS OXIMETRY MLT: CPT

## 2024-06-12 PROCEDURE — 2580000003 HC RX 258: Performed by: STUDENT IN AN ORGANIZED HEALTH CARE EDUCATION/TRAINING PROGRAM

## 2024-06-12 PROCEDURE — 72141 MRI NECK SPINE W/O DYE: CPT

## 2024-06-12 PROCEDURE — 2580000003 HC RX 258: Performed by: INTERNAL MEDICINE

## 2024-06-12 PROCEDURE — APPSS45 APP SPLIT SHARED TIME 31-45 MINUTES: Performed by: INTERNAL MEDICINE

## 2024-06-12 PROCEDURE — 99232 SBSQ HOSP IP/OBS MODERATE 35: CPT | Performed by: INTERNAL MEDICINE

## 2024-06-12 PROCEDURE — 6360000002 HC RX W HCPCS: Performed by: INTERNAL MEDICINE

## 2024-06-12 PROCEDURE — 6370000000 HC RX 637 (ALT 250 FOR IP): Performed by: INTERNAL MEDICINE

## 2024-06-12 PROCEDURE — 97530 THERAPEUTIC ACTIVITIES: CPT

## 2024-06-12 PROCEDURE — 70551 MRI BRAIN STEM W/O DYE: CPT

## 2024-06-12 PROCEDURE — 94640 AIRWAY INHALATION TREATMENT: CPT

## 2024-06-12 PROCEDURE — 36415 COLL VENOUS BLD VENIPUNCTURE: CPT

## 2024-06-12 PROCEDURE — 6360000002 HC RX W HCPCS: Performed by: STUDENT IN AN ORGANIZED HEALTH CARE EDUCATION/TRAINING PROGRAM

## 2024-06-12 PROCEDURE — 99233 SBSQ HOSP IP/OBS HIGH 50: CPT | Performed by: INTERNAL MEDICINE

## 2024-06-12 PROCEDURE — 6370000000 HC RX 637 (ALT 250 FOR IP)

## 2024-06-12 PROCEDURE — 80048 BASIC METABOLIC PNL TOTAL CA: CPT

## 2024-06-12 PROCEDURE — 97165 OT EVAL LOW COMPLEX 30 MIN: CPT

## 2024-06-12 PROCEDURE — 99232 SBSQ HOSP IP/OBS MODERATE 35: CPT | Performed by: PSYCHIATRY & NEUROLOGY

## 2024-06-12 PROCEDURE — 82947 ASSAY GLUCOSE BLOOD QUANT: CPT

## 2024-06-12 PROCEDURE — 97535 SELF CARE MNGMENT TRAINING: CPT

## 2024-06-12 PROCEDURE — 97161 PT EVAL LOW COMPLEX 20 MIN: CPT

## 2024-06-12 PROCEDURE — 6360000002 HC RX W HCPCS: Performed by: NURSE PRACTITIONER

## 2024-06-12 PROCEDURE — 1200000000 HC SEMI PRIVATE

## 2024-06-12 RX ORDER — PYRIDOSTIGMINE BROMIDE 60 MG/1
60 TABLET ORAL EVERY 8 HOURS SCHEDULED
Status: DISCONTINUED | OUTPATIENT
Start: 2024-06-12 | End: 2024-06-18 | Stop reason: HOSPADM

## 2024-06-12 RX ORDER — LANSOPRAZOLE 30 MG/1
30 TABLET, ORALLY DISINTEGRATING, DELAYED RELEASE ORAL
Status: DISCONTINUED | OUTPATIENT
Start: 2024-06-12 | End: 2024-06-13

## 2024-06-12 RX ORDER — LANSOPRAZOLE
30 KIT
Status: DISCONTINUED | OUTPATIENT
Start: 2024-06-12 | End: 2024-06-12 | Stop reason: CLARIF

## 2024-06-12 RX ADMIN — PYRIDOSTIGMINE BROMIDE 60 MG: 60 TABLET ORAL at 21:20

## 2024-06-12 RX ADMIN — LANSOPRAZOLE 30 MG: 30 TABLET, ORALLY DISINTEGRATING, DELAYED RELEASE ORAL at 14:21

## 2024-06-12 RX ADMIN — FENTANYL CITRATE 50 MCG: 50 INJECTION, SOLUTION INTRAMUSCULAR; INTRAVENOUS at 01:40

## 2024-06-12 RX ADMIN — SODIUM CHLORIDE, PRESERVATIVE FREE 10 ML: 5 INJECTION INTRAVENOUS at 21:21

## 2024-06-12 RX ADMIN — SODIUM CHLORIDE, PRESERVATIVE FREE 10 ML: 5 INJECTION INTRAVENOUS at 10:27

## 2024-06-12 RX ADMIN — METOPROLOL TARTRATE 25 MG: 25 TABLET, FILM COATED ORAL at 10:12

## 2024-06-12 RX ADMIN — ATORVASTATIN CALCIUM 20 MG: 20 TABLET, FILM COATED ORAL at 21:20

## 2024-06-12 RX ADMIN — BUDESONIDE AND FORMOTEROL FUMARATE DIHYDRATE 2 PUFF: 80; 4.5 AEROSOL RESPIRATORY (INHALATION) at 08:55

## 2024-06-12 RX ADMIN — HEPARIN SODIUM 5000 UNITS: 5000 INJECTION INTRAVENOUS; SUBCUTANEOUS at 21:21

## 2024-06-12 RX ADMIN — LEVOFLOXACIN 750 MG: 5 INJECTION, SOLUTION INTRAVENOUS at 17:33

## 2024-06-12 RX ADMIN — PYRIDOSTIGMINE BROMIDE 60 MG: 60 TABLET ORAL at 14:18

## 2024-06-12 RX ADMIN — FENTANYL CITRATE 50 MCG: 50 INJECTION, SOLUTION INTRAMUSCULAR; INTRAVENOUS at 17:17

## 2024-06-12 RX ADMIN — WATER 40 MG: 1 INJECTION INTRAMUSCULAR; INTRAVENOUS; SUBCUTANEOUS at 01:41

## 2024-06-12 RX ADMIN — METOPROLOL TARTRATE 25 MG: 25 TABLET, FILM COATED ORAL at 21:20

## 2024-06-12 RX ADMIN — HEPARIN SODIUM 5000 UNITS: 5000 INJECTION INTRAVENOUS; SUBCUTANEOUS at 14:20

## 2024-06-12 RX ADMIN — BUDESONIDE AND FORMOTEROL FUMARATE DIHYDRATE 2 PUFF: 80; 4.5 AEROSOL RESPIRATORY (INHALATION) at 20:31

## 2024-06-12 RX ADMIN — HEPARIN SODIUM 5000 UNITS: 5000 INJECTION INTRAVENOUS; SUBCUTANEOUS at 07:03

## 2024-06-12 RX ADMIN — WATER 40 MG: 1 INJECTION INTRAMUSCULAR; INTRAVENOUS; SUBCUTANEOUS at 10:12

## 2024-06-12 ASSESSMENT — PAIN SCALES - GENERAL
PAINLEVEL_OUTOF10: 3
PAINLEVEL_OUTOF10: 5
PAINLEVEL_OUTOF10: 3

## 2024-06-12 ASSESSMENT — PAIN - FUNCTIONAL ASSESSMENT: PAIN_FUNCTIONAL_ASSESSMENT: PREVENTS OR INTERFERES SOME ACTIVE ACTIVITIES AND ADLS

## 2024-06-12 ASSESSMENT — PAIN DESCRIPTION - ORIENTATION
ORIENTATION: LEFT
ORIENTATION: MID

## 2024-06-12 ASSESSMENT — PAIN DESCRIPTION - LOCATION
LOCATION: ABDOMEN
LOCATION: ABDOMEN

## 2024-06-12 ASSESSMENT — PAIN DESCRIPTION - DESCRIPTORS: DESCRIPTORS: ACHING;CRAMPING

## 2024-06-12 NOTE — CARE COORDINATION
Transitional planning-talked with patient and family about going home with tube feed. Home care list given to family-chose [facility].    1620 faxed referral to [facility]. Faxed referral to [facility] and left message with Nikki.    1625 return call from Nikki at [facility]. Will pull needed information for referral. Needs to know who will follow tube feed.    1630 called Dr. Brijesh Perez's office to see if he would follow the tube feed. Left message with his nurse for a call back.

## 2024-06-12 NOTE — PROGRESS NOTES
278   MPV 9.7 9.7 9.8 9.6 9.7   SEDRATE 84*  --  64*  --   --    CRP 35.0*  --  66.3*  --   --    INR  --  1.1  --   --   --        Chemistry:  Recent Labs     06/09/24  1354 06/09/24  1719 06/10/24  0558 06/10/24  1745 06/11/24  0656 06/12/24  0734     --  138  --  137 138   K 3.6*  --  3.6*  --  3.7 3.9   CL 96*  --  102  --  103 105   CO2 24  --  22  --  24 25   GLUCOSE 77  --  63*  --  69* 199*   BUN 24*  --  14  --  9 10   CREATININE 0.9  --  0.7  --  0.6 0.6   MG 2.2  --   --   --   --   --    ANIONGAP 16  --  14  --  10 8*   LABGLOM 66  --  90  --  >90 >90   CALCIUM 9.2  --  7.9*  --  8.0* 8.0*   TROPHS 93*   < > 99* 86* 87*  --    LACTACIDWB 1.9  --   --   --   --   --     < > = values in this interval not displayed.       Recent Labs     06/09/24  1354 06/10/24  2001 06/11/24  0656 06/11/24  0845 06/11/24  1241 06/11/24  1317 06/11/24  1708 06/11/24  2056 06/12/24  0230   TSH 2.20  --   --   --   --   --   --   --   --    AST 35  --   --   --   --   --   --   --   --    ALT <5*  --   --   --   --   --   --   --   --    ALKPHOS 66  --   --   --   --   --   --   --   --    BILITOT 0.3  --   --   --   --   --   --   --   --    CHOL  --   --  110  --   --   --   --   --   --    HDL  --   --  35*  --   --   --   --   --   --    CHOLHDLRATIO  --   --  3.0  --   --   --   --   --   --    TRIG  --   --  88  --   --   --   --   --   --    VLDL  --   --  18  --   --   --   --   --   --    POCGLU  --    < >  --  94 52* 95 92 175* 182*    < > = values in this interval not displayed.       ABG:No results found for: \"POCPH\", \"PHART\", \"PH\", \"POCPCO2\", \"IYZ8GDP\", \"PCO2\", \"POCPO2\", \"PO2ART\", \"PO2\", \"POCHCO3\", \"YOO0XLR\", \"HCO3\", \"NBEA\", \"PBEA\", \"BEART\", \"BE\", \"THGBART\", \"THB\", \"JPA5BSY\", \"MMCT1KRN\", \"R8SCRWND\", \"O2SAT\", \"FIO2\"  No results found for: \"SPECIAL\"  No results found for: \"CULTURE\"    Radiology:  XR CHEST (2 VW)    Result Date: 6/9/2024  No radiographic evidence of acute cardiopulmonary pathology.  Yes     Plan:        Chronic dysphagia - PEG tube placed yesterday TF started. Dietician consulted.aspiration precautions. IV fluids stopped. Continue to monitor blood sugars. Discussed with patient and daughter. Due to concerns with drooling and nasal voice, ENT and Neurology evaluation obtained. MRI Brain/C spine pending. Iv fluids. Accu checks.  Aspiration pneumonia - on Levofloxacin from yesterday. Aspiration precautions.  Elevated troponins/NSTEMI  - echo done and reported as normal. IV heparin stopped. Hold Asa for few days as she is s/p peg. Cardiology consulted and appreciate evaluation.Statin started. BB resumed.  Acute respiratory failure - resolved.  Severe malnutrition -  PEG tube for nutrition.  RA - no concerns for Scleroderma per daughter. Will repeat labs and consult Rheumatology or follow up op. Discussed with daughter.  ILD - restart inhalers.  DVT prophylaxis - on SQ heparin.    Lucy Liriano MD  6/12/2024  11:35 AM

## 2024-06-12 NOTE — PLAN OF CARE
Problem: Discharge Planning  Goal: Discharge to home or other facility with appropriate resources  Outcome: Progressing     Problem: Pain  Goal: Verbalizes/displays adequate comfort level or baseline comfort level  6/12/2024 1927 by Fatimah Atkinson RN  Outcome: Progressing  6/12/2024 0551 by Foreign Goetz RN  Outcome: Progressing     Problem: ABCDS Injury Assessment  Goal: Absence of physical injury  Outcome: Progressing     Problem: Skin/Tissue Integrity  Goal: Absence of new skin breakdown  Description: 1.  Monitor for areas of redness and/or skin breakdown  2.  Assess vascular access sites hourly  3.  Every 4-6 hours minimum:  Change oxygen saturation probe site  4.  Every 4-6 hours:  If on nasal continuous positive airway pressure, respiratory therapy assess nares and determine need for appliance change or resting period.  Outcome: Progressing     Problem: Nutrition Deficit:  Goal: Optimize nutritional status  6/12/2024 1927 by Fatimah Atkinson RN  Outcome: Progressing  6/12/2024 0551 by Foreign Goetz RN  Outcome: Progressing     Problem: Safety - Adult  Goal: Free from fall injury  Outcome: Progressing

## 2024-06-12 NOTE — PROGRESS NOTES
[facility]   IN-PATIENT SERVICE   [facility]    Progress Note             Date:   6/12/2024  Patient name:  Chayito Navas  Date of admission:  6/9/2024  1:32 PM  MRN:   [id number]  Account:  [id number]  YOB: 1945  PCP:    Brijesh Perez MD  Room:   [address] [place] [address]  Code Status:    Full Code    Chief Complaint:     Chief Complaint   Patient presents with    Dysphagia       Interval hx:     The patient was seen and examined at bedside. Chart and results reviewed.  -Afebrile, hemodynamically stable, was saturating well on 1L NC is now off O2 and continues to saturate well.  -The patient is laying in bed at the time of exam, she still has some mod dysarthria making it difficult to fully understand her, however she stated that she felt a little better.  -S/p PEG yesterday  -Was started on levaquin for concern for pneumonia  -Myasthenia panel pending    Brief History of Present Illness:     78-year-old female with past medical history interstitial lung disease, RA, HTN, asthma, colon cancer, esophageal Schatzki's ring who presents with a 6-month history of progressive dysphagia.  The patient's dysphagia has advanced from solids to now including liquids.  She had prior extensive workup with GI including multiple EGD with dilations of Schatzki's ring.  Esophageal manometry was normal.  Modified barium swallow October 2023 showed substantial delay of entire swallowing process, no aspiration.  The patient is also being treated with heparin drip for NSTEMI.  Neurology consulted for dysphagia.  On exam, the patient is noted to have a plegic tongue with atrophy, no fasciculations, absent gag reflex, right upper extremity weakness, bilateral upper extremity hyperreflexia, and bilateral lower extremity hyperreflexia.     Per patient, she has unexpected weight change.  She also notes a voice change.      Past Medical History:     History reviewed. No pertinent past medical history.

## 2024-06-12 NOTE — PROGRESS NOTES
Cardiology Progress Note                     Date:   6/12/2024  Patient name: Chayito Navas  Date of admission:  6/9/2024  1:32 PM  MRN:   [id number]  YOB: 1945  PCP: Brijesh Perez MD    Reason for Admission:  progressive dysphagia     Subjective:       Patient is now s/p peg tube placement, no perioperative complications. Remains hemodynamically stable and in sinus rhythm. No chest pain, did have dyspnea after emesis last night, given solumedrol, this am feeling much better       Scheduled Meds:   metoprolol tartrate  25 mg Oral BID    methylPREDNISolone  40 mg IntraVENous Q8H    levofloxacin  750 mg IntraVENous Q24H    budesonide-formoterol  2 puff Inhalation BID RT    pantoprazole  40 mg Oral QAM AC    [Held by provider] aspirin  81 mg Oral Daily    atorvastatin  20 mg Oral Nightly    heparin (porcine)  5,000 Units SubCUTAneous 3 times per day    sodium chloride flush  5-40 mL IntraVENous 2 times per day       Continuous Infusions:   sodium chloride 100 mL/hr at 06/12/24 0554    dextrose      dextrose      sodium chloride         Labs:     CBC:   Recent Labs     06/09/24 2104 06/10/24  0558 06/11/24  0656   WBC 9.8 9.5 10.1   HGB 11.1* 10.5* 10.6*    308 265     BMP:    Recent Labs     06/09/24  1354 06/10/24  0558 06/11/24  0656    138 137   K 3.6* 3.6* 3.7   CL 96* 102 103   CO2 24 22 24   BUN 24* 14 9   CREATININE 0.9 0.7 0.6   GLUCOSE 77 63* 69*     Hepatic:   Recent Labs     06/09/24  1354   AST 35   ALT <5*   BILITOT 0.3   ALKPHOS 66     Troponin: No results for input(s): \"TROPONINI\" in the last 72 hours.  BNP: No results for input(s): \"BNP\" in the last 72 hours.  Lipids:   Recent Labs     06/11/24  0656   CHOL 110   HDL 35*     INR:   Recent Labs     06/09/24  2104   INR 1.1         Objective:     Vitals: /63   Pulse 83   Temp 97.8 °F (36.6 °C) (Oral)   Resp 23   Ht 1.49 m (4' 10.66\")   Wt 39.5 kg (87 lb)   SpO2 100%   BMI 17.78 kg/m²     General

## 2024-06-12 NOTE — PROGRESS NOTES
Occupational Therapy  Facility/Department: [address] STEPDOWN  Occupational Therapy Initial Assessment    Name: Chayito Navas  : 1945  MRN: [id number]  Date of Service: 2024    Discharge Recommendations:   No occupational therapy recommended at discharge           Patient Diagnosis(es): The primary encounter diagnosis was Dysphagia, unspecified type. Diagnoses of Weight loss, Elevated troponin, and NSTEMI (non-ST elevated myocardial infarction) (HCC) were also pertinent to this visit.  Past Medical History:  has no past medical history on file.  Past Surgical History:  has a past surgical history that includes Esophagogastroduodenoscopy w/ PEG (2024).           Assessment   Performance deficits / Impairments: Decreased functional mobility ;Decreased ADL status;Decreased endurance;Decreased high-level IADLs;Decreased balance  Assessment: pt would benefit from continued acute OT in order to increase safety and independence with ADLs and functional transfers/functionla mobility.  Prognosis: Good  Decision Making: Low Complexity  REQUIRES OT FOLLOW-UP: Yes  Activity Tolerance  Activity Tolerance: Patient Tolerated treatment well        Plan   Occupational Therapy Plan  Times Per Week: 2-3x/wl  Current Treatment Recommendations: Strengthening, Balance training, Functional mobility training, Endurance training, Safety education & training, Self-Care / ADL, Equipment evaluation, education, & procurement     Restrictions  Restrictions/Precautions  Restrictions/Precautions: Fall Risk  Required Braces or Orthoses?: No  Position Activity Restriction  Other position/activity restrictions: up as tolerated, PEG     Subjective   General  Patient assessed for rehabilitation services?: Yes  Family / Caregiver Present: No  Diagnosis: dysphagia  General Comment  Comments: RN ok'd fortherapy this morning. pt agreeable to participate in session and cooperative/pleasant throughout. pt denied pain. pt difficult to

## 2024-06-12 NOTE — PROGRESS NOTES
[facility]enterology Progress Note    Chayito Navas is a 78 y.o. female patient.  Hospitalization Day:3      Chief consult reason: Dysphagia      Subjective: Patient seen and examined.  Patient overall is doing well.  Denies any abdominal pain  PEG tube placed yesterday. Old dressing observed.  No active bleeding from PEG tube site. PEG tube rotates easily against skin.  Abdominal binder in place   Tube feeding infusing at 40 mL/h    Objective:   VITALS:  /61   Pulse 79   Temp 97.5 °F (36.4 °C) (Oral)   Resp 17   Ht 1.49 m (4' 10.66\")   Wt 39.5 kg (87 lb)   SpO2 98%   BMI 17.78 kg/m²   TEMPERATURE:  Current - Temp: 97.5 °F (36.4 °C); Max - Temp  Av.8 °F (36.6 °C)  Min: 97.5 °F (36.4 °C)  Max: 98.3 °F (36.8 °C)    Physical Assessment:  General appearance:  alert, cooperative and no distress  Mental Status:  oriented to person, place and time and normal affect  Lungs:  clear to auscultation bilaterally, normal effort  Heart:  regular rate and rhythm, no murmur  Abdomen:  soft, nontender, nondistended, +bowel sounds, PEG tube present  Extremities:  no edema, no redness, No clubbing  Skin:  warm, dry, no gross lesions or rashes    CURRENT MEDICATIONS:  Scheduled Meds:   pantoprazole (PROTONIX) 40 mg in sodium chloride (PF) 0.9 % 10 mL injection  40 mg IntraVENous Daily    metoprolol tartrate  25 mg Oral BID    methylPREDNISolone  40 mg IntraVENous Q8H    levofloxacin  750 mg IntraVENous Q24H    budesonide-formoterol  2 puff Inhalation BID RT    [Held by provider] aspirin  81 mg Oral Daily    atorvastatin  20 mg Oral Nightly    heparin (porcine)  5,000 Units SubCUTAneous 3 times per day    sodium chloride flush  5-40 mL IntraVENous 2 times per day     Continuous Infusions:   dextrose      dextrose      sodium chloride       PRN Meds:fentanNYL, albuterol sulfate HFA, dextrose, glucose, dextrose bolus **OR** dextrose bolus, glucagon (rDNA), dextrose, sodium chloride flush, sodium  \"CERULOPLSM\"    Celiac panel  No results found for: \"TISSTRNTIIGG\", \"TTGIGA\", \"IGA\"    IgG  No results found for: \"IGG\"    IgM  No results found for: \"IGM\"    GGT   No components found for: \"LABGGT\"    PT/INR  Recent Labs     06/09/24  2104   PROTIME 14.2   INR 1.1       Cancer Markers:  CEA:  No results found for: \"CEA\"  Ca 125:  No results found for: \"\"  Ca 19-9:   No results found for: \"\"  AFP: No results found for: \"AFP\"    Lactic acid:  Recent Labs     06/09/24  1354   LACTACIDWB 1.9         Radiology Review:    ..  XR CHEST PORTABLE   Final Result   Interval development of a patchy infiltrate in the left lower lobe most   likely representing pneumonia.         XR CHEST (2 VW)   Final Result   No radiographic evidence of acute cardiopulmonary pathology.         MRI BRAIN WO CONTRAST    (Results Pending)   MRI CERVICAL SPINE WO CONTRAST    (Results Pending)           ENDOSCOPY   EGD with PEG  Impression:    Mild erosive gastropathy  Successful placement of 20 Fr PEG tube endoscopically using pull method.      Recommendations:  Protonix 40mg daily for 2 months  Okay to use PEG tube for medications and flush immediately.  Start tube feeds in 6 hours.  Nutrition consult for Tube feeding recommendations.  Avoid anticoagulation for 3 days if okay with prescribing physician.     Attending Attestation: I performed the procedure.     Stephanie Escalona MD     Principal Problem:    Dysphagia  Active Problems:    NSTEMI (non-ST elevated myocardial infarction) (HCC)    Interstitial lung disease (HCC)    Hypertension    Weight loss    History of colon cancer    Severe malnutrition (HCC)    Elevated troponin    RA (rheumatoid arthritis) (HCC)    Anemia associated with nutritional deficiency    Malnutrition (HCC)    Dysarthria    Aspiration pneumonia of left lower lobe due to vomit (HCC)  Resolved Problems:    * No resolved hospital problems. *       GI Assessment:    1.  Dysphagia: Suspect oropharyngeal related.

## 2024-06-12 NOTE — PROGRESS NOTES
Physical Therapy  Facility/Department: [address] STEPDOWN  Physical Therapy Initial Assessment    Name: Chayito Navas  : 1945  MRN: [id number]  Date of Service: 2024  Chief Complaint   Patient presents with    Dysphagia       Discharge Recommendations:  Patient would benefit from continued therapy after discharge   PT Equipment Recommendations  Equipment Needed: No      Patient Diagnosis(es): The primary encounter diagnosis was Dysphagia, unspecified type. Diagnoses of Weight loss, Elevated troponin, and NSTEMI (non-ST elevated myocardial infarction) (HCC) were also pertinent to this visit.  Past Medical History:  has no past medical history on file.  Past Surgical History:  has a past surgical history that includes Esophagogastroduodenoscopy w/ PEG (2024).    Assessment   Body Structures, Functions, Activity Limitations Requiring Skilled Therapeutic Intervention: Decreased functional mobility ;Decreased strength;Increased pain;Decreased posture;Decreased cognition;Decreased ROM;Decreased endurance;Decreased balance  Assessment: Pt ambulated 70ft with no AD CGA, pt demonstrating slow, steady mat throughout session. Pt is expected to benefit from continued skilled physical therapy to address continued gait training, endurance deficits and generalized strengthening to return pt to prior level of independence.  Therapy Prognosis: Good  Decision Making: Medium Complexity  Requires PT Follow-Up: Yes  Activity Tolerance  Activity Tolerance: Patient limited by endurance;Patient limited by fatigue     Plan   Physical Therapy Plan  General Plan:  (5x/week)  Current Treatment Recommendations: Strengthening, Balance training, Endurance training, Safety education & training, Patient/Caregiver education & training, Therapeutic activities, Functional mobility training, Transfer training, Gait training, Equipment evaluation, education, & procurement, Stair training, Home exercise program  Safety Devices  Type of

## 2024-06-13 PROBLEM — G70.9 NEUROMUSCULAR DISEASE (HCC): Status: ACTIVE | Noted: 2024-06-13

## 2024-06-13 LAB
ACE SERPL-CCNC: 33 U/L (ref 16–85)
ANION GAP SERPL CALCULATED.3IONS-SCNC: 7 MMOL/L (ref 9–16)
BASOPHILS # BLD: <0.03 K/UL (ref 0–0.2)
BASOPHILS NFR BLD: 0 % (ref 0–2)
BUN SERPL-MCNC: 22 MG/DL (ref 8–23)
CALCIUM SERPL-MCNC: 8.2 MG/DL (ref 8.6–10.4)
CHLORIDE SERPL-SCNC: 105 MMOL/L (ref 98–107)
CO2 SERPL-SCNC: 25 MMOL/L (ref 20–31)
CREAT SERPL-MCNC: 0.6 MG/DL (ref 0.5–0.9)
EOSINOPHIL # BLD: <0.03 K/UL (ref 0–0.44)
EOSINOPHILS RELATIVE PERCENT: 0 % (ref 1–4)
ERYTHROCYTE [DISTWIDTH] IN BLOOD BY AUTOMATED COUNT: 14.3 % (ref 11.8–14.4)
GFR, ESTIMATED: >90 ML/MIN/1.73M2
GLUCOSE BLD-MCNC: 132 MG/DL (ref 65–105)
GLUCOSE BLD-MCNC: 89 MG/DL (ref 65–105)
GLUCOSE BLD-MCNC: 96 MG/DL (ref 65–105)
GLUCOSE SERPL-MCNC: 115 MG/DL (ref 74–99)
HCT VFR BLD AUTO: 33.1 % (ref 36.3–47.1)
HGB BLD-MCNC: 10.2 G/DL (ref 11.9–15.1)
IMM GRANULOCYTES # BLD AUTO: 0.07 K/UL (ref 0–0.3)
IMM GRANULOCYTES NFR BLD: 1 %
LYMPHOCYTES NFR BLD: 0.7 K/UL (ref 1.1–3.7)
LYMPHOCYTES RELATIVE PERCENT: 6 % (ref 24–43)
MCH RBC QN AUTO: 28.1 PG (ref 25.2–33.5)
MCHC RBC AUTO-ENTMCNC: 30.8 G/DL (ref 28.4–34.8)
MCV RBC AUTO: 91.2 FL (ref 82.6–102.9)
MONOCYTES NFR BLD: 0.62 K/UL (ref 0.1–1.2)
MONOCYTES NFR BLD: 5 % (ref 3–12)
NEUTROPHILS NFR BLD: 88 % (ref 36–65)
NEUTS SEG NFR BLD: 11.44 K/UL (ref 1.5–8.1)
NRBC BLD-RTO: 0 PER 100 WBC
PLATELET # BLD AUTO: 320 K/UL (ref 138–453)
PMV BLD AUTO: 10 FL (ref 8.1–13.5)
POTASSIUM SERPL-SCNC: 3.9 MMOL/L (ref 3.7–5.3)
RBC # BLD AUTO: 3.63 M/UL (ref 3.95–5.11)
SODIUM SERPL-SCNC: 137 MMOL/L (ref 136–145)
WBC OTHER # BLD: 12.8 K/UL (ref 3.5–11.3)

## 2024-06-13 PROCEDURE — 80048 BASIC METABOLIC PNL TOTAL CA: CPT

## 2024-06-13 PROCEDURE — 6360000002 HC RX W HCPCS: Performed by: STUDENT IN AN ORGANIZED HEALTH CARE EDUCATION/TRAINING PROGRAM

## 2024-06-13 PROCEDURE — 6370000000 HC RX 637 (ALT 250 FOR IP): Performed by: INTERNAL MEDICINE

## 2024-06-13 PROCEDURE — 6360000002 HC RX W HCPCS: Performed by: INTERNAL MEDICINE

## 2024-06-13 PROCEDURE — 94640 AIRWAY INHALATION TREATMENT: CPT

## 2024-06-13 PROCEDURE — 6370000000 HC RX 637 (ALT 250 FOR IP)

## 2024-06-13 PROCEDURE — 2580000003 HC RX 258: Performed by: INTERNAL MEDICINE

## 2024-06-13 PROCEDURE — 1200000000 HC SEMI PRIVATE

## 2024-06-13 PROCEDURE — 99232 SBSQ HOSP IP/OBS MODERATE 35: CPT | Performed by: INTERNAL MEDICINE

## 2024-06-13 PROCEDURE — 99232 SBSQ HOSP IP/OBS MODERATE 35: CPT | Performed by: PSYCHIATRY & NEUROLOGY

## 2024-06-13 PROCEDURE — 82947 ASSAY GLUCOSE BLOOD QUANT: CPT

## 2024-06-13 PROCEDURE — 36415 COLL VENOUS BLD VENIPUNCTURE: CPT

## 2024-06-13 PROCEDURE — 85025 COMPLETE CBC W/AUTO DIFF WBC: CPT

## 2024-06-13 PROCEDURE — 94761 N-INVAS EAR/PLS OXIMETRY MLT: CPT

## 2024-06-13 RX ORDER — PYRIDOSTIGMINE BROMIDE 60 MG/1
60 TABLET ORAL EVERY 8 HOURS SCHEDULED
Qty: 60 TABLET | Refills: 1 | Status: SHIPPED | OUTPATIENT
Start: 2024-06-13 | End: 2024-06-18

## 2024-06-13 RX ORDER — ASPIRIN 81 MG/1
81 TABLET, CHEWABLE ORAL DAILY
Qty: 30 TABLET | Refills: 0 | Status: SHIPPED | OUTPATIENT
Start: 2024-06-14 | End: 2024-06-18

## 2024-06-13 RX ORDER — PANTOPRAZOLE SODIUM 40 MG/1
40 TABLET, DELAYED RELEASE ORAL DAILY
Qty: 30 TABLET | Refills: 2 | Status: SHIPPED | OUTPATIENT
Start: 2024-06-13

## 2024-06-13 RX ORDER — LANSOPRAZOLE 30 MG/1
30 TABLET, ORALLY DISINTEGRATING, DELAYED RELEASE ORAL
Status: DISCONTINUED | OUTPATIENT
Start: 2024-06-14 | End: 2024-06-18 | Stop reason: HOSPADM

## 2024-06-13 RX ORDER — ATORVASTATIN CALCIUM 20 MG/1
20 TABLET, FILM COATED ORAL NIGHTLY
Qty: 30 TABLET | Refills: 3 | Status: SHIPPED | OUTPATIENT
Start: 2024-06-13 | End: 2024-06-18

## 2024-06-13 RX ADMIN — LANSOPRAZOLE 30 MG: 30 TABLET, ORALLY DISINTEGRATING, DELAYED RELEASE ORAL at 06:03

## 2024-06-13 RX ADMIN — METOPROLOL TARTRATE 25 MG: 25 TABLET, FILM COATED ORAL at 09:37

## 2024-06-13 RX ADMIN — BUDESONIDE AND FORMOTEROL FUMARATE DIHYDRATE 2 PUFF: 80; 4.5 AEROSOL RESPIRATORY (INHALATION) at 08:13

## 2024-06-13 RX ADMIN — SODIUM CHLORIDE, PRESERVATIVE FREE 10 ML: 5 INJECTION INTRAVENOUS at 20:21

## 2024-06-13 RX ADMIN — HEPARIN SODIUM 5000 UNITS: 5000 INJECTION INTRAVENOUS; SUBCUTANEOUS at 13:56

## 2024-06-13 RX ADMIN — HEPARIN SODIUM 5000 UNITS: 5000 INJECTION INTRAVENOUS; SUBCUTANEOUS at 06:03

## 2024-06-13 RX ADMIN — HEPARIN SODIUM 5000 UNITS: 5000 INJECTION INTRAVENOUS; SUBCUTANEOUS at 20:20

## 2024-06-13 RX ADMIN — SODIUM CHLORIDE, PRESERVATIVE FREE 10 ML: 5 INJECTION INTRAVENOUS at 11:14

## 2024-06-13 RX ADMIN — PYRIDOSTIGMINE BROMIDE 60 MG: 60 TABLET ORAL at 20:22

## 2024-06-13 RX ADMIN — LEVOFLOXACIN 750 MG: 5 INJECTION, SOLUTION INTRAVENOUS at 18:51

## 2024-06-13 RX ADMIN — BUDESONIDE AND FORMOTEROL FUMARATE DIHYDRATE 2 PUFF: 80; 4.5 AEROSOL RESPIRATORY (INHALATION) at 20:08

## 2024-06-13 RX ADMIN — PYRIDOSTIGMINE BROMIDE 60 MG: 60 TABLET ORAL at 06:03

## 2024-06-13 RX ADMIN — ATORVASTATIN CALCIUM 20 MG: 20 TABLET, FILM COATED ORAL at 20:21

## 2024-06-13 RX ADMIN — PYRIDOSTIGMINE BROMIDE 60 MG: 60 TABLET ORAL at 13:56

## 2024-06-13 RX ADMIN — METOPROLOL TARTRATE 25 MG: 25 TABLET, FILM COATED ORAL at 20:21

## 2024-06-13 NOTE — PLAN OF CARE
Problem: Respiratory - Adult  Goal: Achieves optimal ventilation and oxygenation  Flowsheets (Taken 6/13/2024 0203)  Achieves optimal ventilation and oxygenation:   Assess for changes in mentation and behavior   Respiratory therapy support as indicated   Assess the need for suctioning and aspirate as needed   Assess for changes in respiratory status   Assess and instruct to report shortness of breath or any respiratory difficulty   Encourage broncho-pulmonary hygiene including cough, deep breathe, incentive spirometry

## 2024-06-13 NOTE — PROGRESS NOTES
Physical Therapy        Physical Therapy Cancel Note      DATE: 2024    NAME: Chayito Navas  MRN: [id number]   : 1945      Patient not seen this date for Physical Therapy due to:    Patient Declined: Pt stating she is not feeling well this afternoon and would like to hold off on PT. Will check back tomorrow.       Electronically signed by Toshia Collins PTA on 2024 at 3:59 PM

## 2024-06-13 NOTE — DISCHARGE INSTRUCTIONS
Follow up with  for EMG  Follow up with neurologist in 2-3 weeks  Continue TF  Follow up with your rheumatologist

## 2024-06-13 NOTE — DISCHARGE INSTR - COC
Continuity of Care Form    Patient Name: Chayito Navas   :  1945  MRN:  [id number]    Admit date:  2024  Discharge date:  ***    Code Status Order: Full Code   Advance Directives:   Advance Care Flowsheet Documentation       Date/Time Healthcare Directive Type of Healthcare Directive Copy in Chart Healthcare Agent Appointed Healthcare Agent's Name Healthcare Agent's Phone Number    24 0923 No, patient does not have an advance directive for healthcare treatment -- -- -- -- --            Admitting Physician:  Lucy Liriano MD  PCP: Brijesh Perez MD    Discharging Nurse: Marissa CHAN  Discharging Hospital Unit/Room#: 0535/0535-01  Discharging Unit Phone Number: [phone]    Emergency Contact:   Extended Emergency Contact Information  Primary Emergency Contact: soni navas  Home Phone: [phone]  Mobile Phone: [phone]  Relation: Spouse  Preferred language: English   needed? No  Secondary Emergency Contact: Vanessa Carey  Home Phone: [phone]  Mobile Phone: [phone]  Relation: Child  Preferred language: English   needed? No    Past Surgical History:  Past Surgical History:   Procedure Laterality Date    ESOPHAGOGASTRODUODENOSCOPY W/ PEG  2024    UPPER GASTROINTESTINAL ENDOSCOPY N/A 2024    ** ADD ON**ESOPHAGOGASTRODUODENOSCOPY PERCUTANEOUS ENDOSCOPIC GASTROSTOMY TUBE INSERTION FOR DYSPHAGIA AND WEIGHTLOSS performed by Stephanie Escalona MD at [facility] OR       Immunization History:     There is no immunization history on file for this patient.    Active Problems:  Patient Active Problem List   Diagnosis Code    Dysphagia R13.10    Interstitial lung disease (HCC) J84.9    Hypertension I10    Weight loss R63.4    History of colon cancer Z85.038    Severe malnutrition (HCC) E43    Elevated troponin R79.89    RA (rheumatoid arthritis) (HCC) M06.9    Anemia associated with nutritional deficiency D53.9    NSTEMI (non-ST elevated myocardial infarction)  Flush Volume (mL): 50  Water Flush Frequency: Before and after each bolus  Physician Certification: I certify the above information and transfer of Chayito Navas  is necessary for the continuing treatment of the diagnosis listed and that she requires Home Care for greater 30 days.     Update Admission H&P: No change in H&P    PHYSICIAN SIGNATURE:  Electronically signed by Lucy Liriano MD on 6/13/24 at 3:35 PM EDT

## 2024-06-13 NOTE — PROGRESS NOTES
[facility]  Office: [phone]  Dario Verma DO, Dustin Amador DO, Nathan De La Cruz DO, Juarez Su DO, April Zamorano MD, Erlinda Sanchez MD, Everette Harden MD, Bridget Rowe MD,  Celestino Smith MD, Arianna Lopes MD, Kade Joaquin MD,  Brigette Almonte DO, Kolton Linder MD, Vijay Childers MD, Timothy Verma DO, Jenn Camp MD,  Lawson Diallo DO, Lucy Liriano MD, Allyn Kruger MD, Loulou Resendiz MD, Luz Jordan MD,  Dick Stock MD, Shanon Ugalde MD, Vy Armendariz MD, Brennan Yo MD, Ramiro Kent MD, Alan Thompson MD, Niko King DO, Roberto Bhatt DO, Gianfranco Henriquez MD,  Fernandez Tilley MD, Shirley Waterhouse, CNP,  Yoanna Medina CNP, Juarez Webb, CNP,  Merary Crespo, DNP, Ilda Jean-Baptiste, CNP, Milagros Messina, CNP, Florence Saenz, CNP, Cristina Belle, CNP, Devika Olvera, PA-C, Mary Kirkpatrick PA-C, Cherry Watson, CNP, Erma Cifuentes, CNP, Benny Marie, CNP, Pebbles Proctor, CNP, Holly Jones, CNP, Altagracia Iglesias, CNS, Shannan Cruz, CNP, Erika Velez CNP, Tracy Schwab, CNP         [facility]   IN-PATIENT SERVICE   [facility]    Progress Note    6/13/2024    8:02 AM    Name:   Chayito Navas  MRN:     [id number]     Acct:      [id number]   Room:   0535/0535-01   Day:  4  Admit Date:  6/9/2024  1:32 PM    PCP:   Brijesh Perez MD  Code Status:  Full Code    Subjective:     C/C:   Chief Complaint   Patient presents with    Dysphagia     Interval History Status: not changed.     06/13/2024  Patient denies complaints. Tolerating TF. Afebrile. On Levofloxacin for aspiration pneumonia. Mestinon added by Neurology. Awaiting further recs before discharge home with Home care and out patient follow up. Speech seems a little improved.    06/12/2024  Patient had 1 episode of emesis yesterday and then aspirated. She had CXR showing infiltrate.  Started on levofloxacin.  She is better today. Afebrile. Off oxygen.  Tolerating TF.

## 2024-06-13 NOTE — PROGRESS NOTES
[facility] Neurology   IN-PATIENT SERVICE   [facility] [place]    Progress Note             Date:   6/13/2024  Patient name:  Chayito Navas  Date of admission:  6/9/2024  1:32 PM  MRN:   [id number]  Account:  [id number]  YOB: 1945  PCP:    Brijesh Perez MD  Room:   [address] [place] [address]  Code Status:    Full Code    Chief Complaint:     Chief Complaint   Patient presents with    Dysphagia       Interval hx:     The patient was seen and examined at bedside. Chart and results reviewed.  -Afebrile, hemodynamically stable, was saturating well off O2.  -The patient is sitting in chair at time of exam, she still has some mod dysarthria making it difficult to fully understand her, She stated that she felt like she lost her voice a bit, but feels about the same as yesterday  -S/p PEG 6/11  -Myasthenia panel pending    Brief History of Present Illness:     78-year-old female with past medical history interstitial lung disease, RA, HTN, asthma, colon cancer, esophageal Schatzki's ring who presents with a 6-month history of progressive dysphagia.  The patient's dysphagia has advanced from solids to now including liquids.  She had prior extensive workup with GI including multiple EGD with dilations of Schatzki's ring.  Esophageal manometry was normal.  Modified barium swallow October 2023 showed substantial delay of entire swallowing process, no aspiration.  The patient is also being treated with heparin drip for NSTEMI.  Neurology consulted for dysphagia.  On exam, the patient is noted to have a plegic tongue with atrophy, no fasciculations, absent gag reflex, right upper extremity weakness, bilateral upper extremity hyperreflexia, and bilateral lower extremity hyporeflexia.     Per patient, she has unexpected weight change.  She also notes a voice change.      Past Medical History:     History reviewed. No pertinent past medical history.     Past Surgical History:     Past Surgical History:   Procedure

## 2024-06-13 NOTE — CARE COORDINATION
Transition Planning: Spoke with pt, plan is still home with TF and home care. Spoke with [facility] can accept pt. Spoke with Nikki with [facility] need length of TF need documented in a progress note and will need a following physician. Dr. Liriano notified. Left a VM with Dr. Arnett (pts PCP) office requesting him as a following physician for the TF, await callback.     1636- Spoke with Dr. Arnett office, requesting that pts GI doctor follow. Dr. Eid Office# [phone], office closed at this time. Will need a call tomorrow.

## 2024-06-13 NOTE — PLAN OF CARE
Problem: Discharge Planning  Goal: Discharge to home or other facility with appropriate resources  Outcome: Progressing  Flowsheets (Taken 6/13/2024 0800)  Discharge to home or other facility with appropriate resources:   Identify barriers to discharge with patient and caregiver   Arrange for needed discharge resources and transportation as appropriate   Identify discharge learning needs (meds, wound care, etc)   Arrange for interpreters to assist at discharge as needed   Refer to discharge planning if patient needs post-hospital services based on physician order or complex needs related to functional status, cognitive ability or social support system     Problem: Pain  Goal: Verbalizes/displays adequate comfort level or baseline comfort level  Outcome: Progressing     Problem: ABCDS Injury Assessment  Goal: Absence of physical injury  Outcome: Progressing     Problem: Skin/Tissue Integrity  Goal: Absence of new skin breakdown  Description: 1.  Monitor for areas of redness and/or skin breakdown  2.  Assess vascular access sites hourly  3.  Every 4-6 hours minimum:  Change oxygen saturation probe site  4.  Every 4-6 hours:  If on nasal continuous positive airway pressure, respiratory therapy assess nares and determine need for appliance change or resting period.  Outcome: Progressing     Problem: Nutrition Deficit:  Goal: Optimize nutritional status  Outcome: Progressing     Problem: Safety - Adult  Goal: Free from fall injury  Outcome: Progressing     Problem: Respiratory - Adult  Goal: Achieves optimal ventilation and oxygenation  Outcome: Progressing  Flowsheets (Taken 6/13/2024 0800)  Achieves optimal ventilation and oxygenation:   Assess for changes in respiratory status   Assess for changes in mentation and behavior   Position to facilitate oxygenation and minimize respiratory effort   Oxygen supplementation based on oxygen saturation or arterial blood gases   Encourage broncho-pulmonary hygiene including  mobilization and activity   Nutrition consult to assist patient with appropriate food choices     Problem: Gastrointestinal - Adult  Goal: Maintains adequate nutritional intake  Outcome: Progressing  Flowsheets (Taken 6/13/2024 0800)  Maintains adequate nutritional intake:   Identify factors contributing to decreased intake, treat as appropriate   Monitor intake and output, weight and lab values   Obtain nutritional consult as needed     Problem: Gastrointestinal - Adult  Goal: Establish and maintain optimal ostomy function  Outcome: Progressing  Flowsheets (Taken 6/13/2024 0800)  Establish and maintain optimal ostomy function:   Administer IV fluids and TPN as ordered   Introduce and advance enteral feedings as ordered   Nutrition consult

## 2024-06-14 LAB
ACHR BIND AB SER-SCNC: 0.1 NMOL/L (ref 0–0.4)
ACHR BLOCK AB/ACHR TOTAL SFR SER: 0 % (ref 0–26)
GLUCOSE BLD-MCNC: 120 MG/DL (ref 65–105)
STRIATED MUSCLE AB, IGG SCREEN: NORMAL
TITIN ANTIBODY: 0.09 IV (ref 0–0.45)

## 2024-06-14 PROCEDURE — 6360000002 HC RX W HCPCS: Performed by: INTERNAL MEDICINE

## 2024-06-14 PROCEDURE — 6370000000 HC RX 637 (ALT 250 FOR IP): Performed by: INTERNAL MEDICINE

## 2024-06-14 PROCEDURE — 99232 SBSQ HOSP IP/OBS MODERATE 35: CPT | Performed by: INTERNAL MEDICINE

## 2024-06-14 PROCEDURE — 99231 SBSQ HOSP IP/OBS SF/LOW 25: CPT | Performed by: PSYCHIATRY & NEUROLOGY

## 2024-06-14 PROCEDURE — 94760 N-INVAS EAR/PLS OXIMETRY 1: CPT

## 2024-06-14 PROCEDURE — 2580000003 HC RX 258: Performed by: INTERNAL MEDICINE

## 2024-06-14 PROCEDURE — 82947 ASSAY GLUCOSE BLOOD QUANT: CPT

## 2024-06-14 PROCEDURE — 6370000000 HC RX 637 (ALT 250 FOR IP)

## 2024-06-14 PROCEDURE — 1200000000 HC SEMI PRIVATE

## 2024-06-14 PROCEDURE — 94640 AIRWAY INHALATION TREATMENT: CPT

## 2024-06-14 RX ADMIN — ATORVASTATIN CALCIUM 20 MG: 20 TABLET, FILM COATED ORAL at 20:49

## 2024-06-14 RX ADMIN — PYRIDOSTIGMINE BROMIDE 60 MG: 60 TABLET ORAL at 20:50

## 2024-06-14 RX ADMIN — BUDESONIDE AND FORMOTEROL FUMARATE DIHYDRATE 2 PUFF: 80; 4.5 AEROSOL RESPIRATORY (INHALATION) at 08:42

## 2024-06-14 RX ADMIN — METOPROLOL TARTRATE 25 MG: 25 TABLET, FILM COATED ORAL at 20:49

## 2024-06-14 RX ADMIN — ACETAMINOPHEN 650 MG: 325 TABLET ORAL at 13:28

## 2024-06-14 RX ADMIN — SODIUM CHLORIDE, PRESERVATIVE FREE 10 ML: 5 INJECTION INTRAVENOUS at 21:12

## 2024-06-14 RX ADMIN — HEPARIN SODIUM 5000 UNITS: 5000 INJECTION INTRAVENOUS; SUBCUTANEOUS at 13:27

## 2024-06-14 RX ADMIN — METOPROLOL TARTRATE 25 MG: 25 TABLET, FILM COATED ORAL at 09:18

## 2024-06-14 RX ADMIN — SODIUM CHLORIDE, PRESERVATIVE FREE 10 ML: 5 INJECTION INTRAVENOUS at 09:17

## 2024-06-14 RX ADMIN — HEPARIN SODIUM 5000 UNITS: 5000 INJECTION INTRAVENOUS; SUBCUTANEOUS at 20:50

## 2024-06-14 RX ADMIN — BUDESONIDE AND FORMOTEROL FUMARATE DIHYDRATE 2 PUFF: 80; 4.5 AEROSOL RESPIRATORY (INHALATION) at 21:04

## 2024-06-14 RX ADMIN — HEPARIN SODIUM 5000 UNITS: 5000 INJECTION INTRAVENOUS; SUBCUTANEOUS at 05:45

## 2024-06-14 RX ADMIN — LANSOPRAZOLE 30 MG: 30 TABLET, ORALLY DISINTEGRATING, DELAYED RELEASE ORAL at 05:46

## 2024-06-14 RX ADMIN — PYRIDOSTIGMINE BROMIDE 60 MG: 60 TABLET ORAL at 05:45

## 2024-06-14 RX ADMIN — PYRIDOSTIGMINE BROMIDE 60 MG: 60 TABLET ORAL at 15:40

## 2024-06-14 ASSESSMENT — PAIN DESCRIPTION - LOCATION: LOCATION: ABDOMEN

## 2024-06-14 ASSESSMENT — PAIN DESCRIPTION - DESCRIPTORS: DESCRIPTORS: ACHING

## 2024-06-14 ASSESSMENT — PAIN SCALES - GENERAL: PAINLEVEL_OUTOF10: 4

## 2024-06-14 NOTE — PROGRESS NOTES
[facility] Neurology   IN-PATIENT SERVICE  General Neurology Progress Note   [facility]                Date:   6/14/2024  Patient name:  Chayito Navas  Date of admission:  6/9/2024  1:32 PM  MRN:   [id number]  Account:  [id number]  YOB: 1945  PCP:    Brijesh Perez MD  Room:   [address] [place] [address]  Code Status:    Full Code  Chief Complaint:   Chief Complaint   Patient presents with    Dysphagia       Interval history      The patient was seen and examined at bedside this morning.     Labs, chart, and VS reviewed. No acute events overnight.     Brief History     Chayito Navas is a 78 y.o. female with a past medical history of interstitial lung disease, rheumatoid arthritis, hypertension, asthma, colon cancer, and esophageal Schatzki's ring who presents with 6-month history of progressive dysphagia.  Advanced from solids to include now liquids.  Patient had prior extensive workup with GI including multiple EGD with dilations of Schatzki's ring.  Esophageal manometry was normal.  Modified barium swallow done in October 2023 showed substantial delay of entire swallowing process, no aspiration.  Patient is to get PEG tube tomorrow.  Also having NSTEMI on heparin drip.  Neurology consulted for evaluation of her dysphagia.  Patient is very hard to understand due to marked bulbar palsy.  On exam, patient has paretic tongue with atrophy, no fasciculations, absent gag reflex, as well as bilateral lower extremity hypoflexia to absent reflexes.  MRI brain showed no acute findings, chronic microvascular disease.  MRI C-spine showed mild spinal canal stenosis and neuroforaminal narrowing at C6-7, C3-4 and C4-5, likely noncontributory to current symptoms.      Review of Systems   Review of Systems   Constitutional:  Negative for chills and fever.   HENT:  Positive for trouble swallowing and voice change.    Respiratory:  Negative for shortness of breath.    Cardiovascular:  Negative for

## 2024-06-14 NOTE — PLAN OF CARE
Problem: Discharge Planning  Goal: Discharge to home or other facility with appropriate resources  Outcome: Progressing  Flowsheets (Taken 6/14/2024 0800)  Discharge to home or other facility with appropriate resources:   Identify barriers to discharge with patient and caregiver   Arrange for needed discharge resources and transportation as appropriate   Identify discharge learning needs (meds, wound care, etc)     Problem: Pain  Goal: Verbalizes/displays adequate comfort level or baseline comfort level  Outcome: Progressing     Problem: ABCDS Injury Assessment  Goal: Absence of physical injury  Outcome: Progressing     Problem: Skin/Tissue Integrity  Goal: Absence of new skin breakdown  Description: 1.  Monitor for areas of redness and/or skin breakdown  2.  Assess vascular access sites hourly  3.  Every 4-6 hours minimum:  Change oxygen saturation probe site  4.  Every 4-6 hours:  If on nasal continuous positive airway pressure, respiratory therapy assess nares and determine need for appliance change or resting period.  Outcome: Progressing     Problem: Nutrition Deficit:  Goal: Optimize nutritional status  Outcome: Progressing  Flowsheets (Taken 6/14/2024 0930 by Chica Page, AJIT)  Nutrient intake appropriate for improving, restoring, or maintaining nutritional needs:   Assess nutritional status and recommend course of action   Recommend, monitor, and adjust tube feedings and TPN/PPN based on assessed needs     Problem: Safety - Adult  Goal: Free from fall injury  Outcome: Progressing     Problem: Respiratory - Adult  Goal: Achieves optimal ventilation and oxygenation  6/14/2024 1208 by Karen Wells, RN  Outcome: Progressing  6/14/2024 0843 by Anne Coronado RCP  Outcome: Progressing  Flowsheets (Taken 6/14/2024 0800 by Karen Wells, RN)  Achieves optimal ventilation and oxygenation:   Assess for changes in respiratory status   Assess for changes in mentation and behavior   Position to facilitate

## 2024-06-14 NOTE — PROGRESS NOTES
[facility]  Office: [phone]  Dario Verma DO, Dustin Amador DO, Nathan De La Cruz DO, Juarez Su DO, April Zamorano MD, Erlinda Sanchez MD, Everette Harden MD, Bridget Rowe MD,  Celestino Smith MD, Arianna Lopes MD, Kade Joaquin MD,  Brigette Almonte DO, Kolton Linder MD, Vijay Childers MD, Timothy Verma DO, Jenn Camp MD,  Lawson Diallo DO, Lucy Liriano MD, Allyn Kruger MD, Loulou Resendiz MD, Luz Jordan MD,  Dick Stock MD, Shanon Ugalde MD, Vy Armendariz MD, Brennan Yo MD, Ramrio Kent MD, Alan Thompson MD, Niko King DO, Roberto Bhatt DO, Gianfranco Henriquez MD,  Fernandez Tilley MD, Shirley Waterhouse, CNP,  Yoanna Medina CNP, Juarez Webb, CNP,  Merary Crespo, DNP, Ilda Jean-Baptiste, CNP, Milagros Messina, CNP, Florence Saenz, CNP, Cristina Belle, CNP, Devika Olvera, PA-C, Mary Kirkpatrick PA-C, Cherry Watson, CNP, Erma Cifuentes, CNP, Benny Marie, CNP, Pebbles Proctor, CNP, Holly Jones, CNP, Altagracia Iglesias, CNS, Shannan Cruz, CNP, Erika Velez CNP, Tracy Schwab, CNP         [facility]   IN-PATIENT SERVICE   [facility]    Progress Note    6/14/2024    9:23 AM    Name:   Chayito Navas  MRN:     [id number]     Acct:      [id number]   Room:   0535/0535-01   Day:  5  Admit Date:  6/9/2024  1:32 PM    PCP:   Brijesh Perez MD  Code Status:  Full Code    Subjective:     C/C:   Chief Complaint   Patient presents with    Dysphagia     Interval History Status: not changed.     No issues overnight  Tolerating TF  DC planning in progress  No other complaints     Brief History:     Chayito Navas is a 78 y.o. female with past medical history of interstitial lung disease, rheumatoid arthritis, hypertension, asthma colon cancer in 2012 presented to the hospital for dysphagia. She was recently seen at [facility] and underwent EGD/Dilatation 5/24.per patient and family she has been experiencing difficulty swallowing  Reactions    Pcn [Penicillins] Rash       Current Meds:   Scheduled Meds:    lansoprazole  30 mg Per G Tube QAM AC    pyRIDostigmine  60 mg Oral 3 times per day    metoprolol tartrate  25 mg Oral BID    budesonide-formoterol  2 puff Inhalation BID RT    [Held by provider] aspirin  81 mg Oral Daily    atorvastatin  20 mg Oral Nightly    heparin (porcine)  5,000 Units SubCUTAneous 3 times per day    sodium chloride flush  5-40 mL IntraVENous 2 times per day     Continuous Infusions:    dextrose      dextrose      sodium chloride       PRN Meds: fentanNYL, albuterol sulfate HFA, dextrose, glucose, dextrose bolus **OR** dextrose bolus, glucagon (rDNA), dextrose, sodium chloride flush, sodium chloride, potassium chloride **OR** potassium alternative oral replacement **OR** potassium chloride, magnesium sulfate, ondansetron **OR** ondansetron, polyethylene glycol, acetaminophen **OR** acetaminophen    Data:     Past Medical History:   has no past medical history on file.    Social History:        Family History: History reviewed. No pertinent family history.    Vitals:  BP (!) 141/64   Pulse 79   Temp 98.8 °F (37.1 °C) (Oral)   Resp 20   Ht 1.49 m (4' 10.66\")   Wt 39.5 kg (87 lb)   SpO2 98%   BMI 17.78 kg/m²   Temp (24hrs), Av.5 °F (36.9 °C), Min:98.3 °F (36.8 °C), Max:98.8 °F (37.1 °C)    Recent Labs     246 24  0636 24  1637 24   POCGLU 140* 132* 96 89       I/O (24Hr):    Intake/Output Summary (Last 24 hours) at 2024 0923  Last data filed at 2024 0200  Gross per 24 hour   Intake 1449 ml   Output --   Net 1449 ml       Labs:  Hematology:  Recent Labs     24  0734 24  0936   WBC 15.2* 12.8*   RBC 4.07 3.63*   HGB 11.2* 10.2*   HCT 36.7 33.1*   MCV 90.2 91.2   MCH 27.5 28.1   MCHC 30.5 30.8   RDW 13.9 14.3    320   MPV 9.7 10.0     Chemistry:  Recent Labs     24  0734 24  0936    137   K 3.9 3.9    105   CO2 25 25   GLUCOSE

## 2024-06-14 NOTE — PLAN OF CARE
Problem: Respiratory - Adult  Goal: Achieves optimal ventilation and oxygenation  6/14/2024 0843 by Anne Coronado, LAURA  Outcome: Progressing   BRONCHOSPASM/BRONCHOCONSTRICTION     [x]         IMPROVE AERATION/BREATH SOUNDS  [x]   ADMINISTER BRONCHODILATOR THERAPY AS APPROPRIATE  [x]   ASSESS BREATH SOUNDS  [x]   IMPLEMENT AEROSOL/MDI PROTOCOL  [x]   PATIENT EDUCATION AS NEEDED

## 2024-06-14 NOTE — PLAN OF CARE
Progressing  6/13/2024 1722 by Enid Medellin RN  Outcome: Progressing     Problem: Nutrition Deficit:  Goal: Optimize nutritional status  6/13/2024 2103 by Karen Perez RN  Outcome: Progressing  6/13/2024 1722 by Enid Medellin RN  Outcome: Progressing     Problem: Safety - Adult  Goal: Free from fall injury  6/13/2024 2103 by Karen Perez RN  Outcome: Progressing  Flowsheets (Taken 6/13/2024 2000)  Free From Fall Injury: Instruct family/caregiver on patient safety  6/13/2024 1722 by Enid Medellin RN  Outcome: Progressing     Problem: Respiratory - Adult  Goal: Achieves optimal ventilation and oxygenation  6/13/2024 2103 by Karen Perez RN  Outcome: Progressing  6/13/2024 1722 by Enid Medellin RN  Outcome: Progressing  Flowsheets (Taken 6/13/2024 0800)  Achieves optimal ventilation and oxygenation:   Assess for changes in respiratory status   Assess for changes in mentation and behavior   Position to facilitate oxygenation and minimize respiratory effort   Oxygen supplementation based on oxygen saturation or arterial blood gases   Encourage broncho-pulmonary hygiene including cough, deep breathe, incentive spirometry   Assess and instruct to report shortness of breath or any respiratory difficulty   Respiratory therapy support as indicated     Problem: Neurosensory - Adult  Goal: Achieves stable or improved neurological status  6/13/2024 2103 by Karen Perez RN  Outcome: Progressing  6/13/2024 1722 by Enid Medellin RN  Outcome: Progressing  Flowsheets (Taken 6/13/2024 0800)  Achieves stable or improved neurological status:   Assess for and report changes in neurological status   Maintain blood pressure and fluid volume within ordered parameters to optimize cerebral perfusion and minimize risk of hemorrhage   Initiate measures to prevent increased intracranial pressure   Monitor temperature, glucose, and sodium. Initiate appropriate interventions as ordered  Goal: Achieves  maximal functionality and self care  6/13/2024 2103 by Karen Perez RN  Outcome: Progressing  6/13/2024 1722 by Enid Medellin RN  Outcome: Progressing  Flowsheets (Taken 6/13/2024 0800)  Achieves maximal functionality and self care:   Monitor swallowing and airway patency with patient fatigue and changes in neurological status   Encourage and assist patient to increase activity and self care with guidance from physical therapy/occupational therapy   Encourage visually impaired, hearing impaired and aphasic patients to use assistive/communication devices     Problem: Gastrointestinal - Adult  Goal: Minimal or absence of nausea and vomiting  6/13/2024 2103 by Karen Perez RN  Outcome: Progressing  6/13/2024 1722 by Enid Medellin RN  Outcome: Progressing  Flowsheets (Taken 6/13/2024 0800)  Minimal or absence of nausea and vomiting:   Administer IV fluids as ordered to ensure adequate hydration   Maintain NPO status until nausea and vomiting are resolved   Administer ordered antiemetic medications as needed   Provide nonpharmacologic comfort measures as appropriate  Goal: Maintains or returns to baseline bowel function  6/13/2024 2103 by Karen Perez RN  Outcome: Progressing  6/13/2024 1722 by Enid Medellin RN  Outcome: Progressing  Flowsheets (Taken 6/13/2024 0800)  Maintains or returns to baseline bowel function:   Assess bowel function   Administer IV fluids as ordered to ensure adequate hydration   Encourage mobilization and activity   Nutrition consult to assist patient with appropriate food choices  Goal: Maintains adequate nutritional intake  6/13/2024 2103 by Karen Perez RN  Outcome: Progressing  6/13/2024 1722 by Enid Medellin RN  Outcome: Progressing  Flowsheets (Taken 6/13/2024 0800)  Maintains adequate nutritional intake:   Identify factors contributing to decreased intake, treat as appropriate   Monitor intake and output, weight and lab values   Obtain nutritional

## 2024-06-14 NOTE — PROGRESS NOTES
Nutrition Note    Spoke with  who reports Pt and family are requesting Pt be discharged on bolus feedings.  This is appropriate.  Will modify order to initiate bolus feedings while here.  Continue Jevity 1.2 (standard with fiber), 100 ml bolus five times daily.  Advance by 50 ml every other feeding to goal volume of 237 ml five times daily.  Flush with 50 ml water before and after each feeding.    Electronically signed by Chica Page RD on 6/14/24 at 2:52 PM EDT    Contact: [phone]

## 2024-06-14 NOTE — CARE COORDINATION
Transitional planning-called Dr. Eid's office([phone]) multiple times-no one ever answered or picked up the phone. GI doctor to see if he would follow the tube feeding.

## 2024-06-14 NOTE — PROGRESS NOTES
Comprehensive Nutrition Assessment    Type and Reason for Visit:  Reassess    Nutrition Recommendations/Plan:   Recommend to change to bolus feedings, Standard with Fiber (Jevity 1.2), 237 ml bolus five times daily with 50 ml water flush before and after each feeding  Recommend MVI with minerals daily via PEG     Malnutrition Assessment:  Malnutrition Status:  Severe malnutrition (06/10/24 0946)    Context:  Chronic Illness       Nutrition Assessment:    Pt remains NPO with continuous TF via PEG for nutrition support, Jevity 1.2 (standard with fiber) at 40 ml/hr.  This is meeting ~96% of estimated kcal and protein needs, though is not meeting RDI's for vitamins/minerals.  Pt reports good tolerance without any GI upset or pain, though she still has some nausea.  Pt reports plan is to return to her home with TF, and Pt is requesting bolus feedings to provide a better quality of life upon discharge.  This writer agrees with bolus feedings if well tolerated.  Suggest continuing Jevity 1.2 and changing to bolus feedins of 237 ml five times daily with 50 ml water flush before and after each feeding to slightly exceed estimated kcal and protein needs.  Also recommend MVI with minerals daily.    Nutrition Related Findings:    Meds/Labs reviewed. Wound Type: Surgical Incision       Current Nutrition Intake & Therapies:    Average Meal Intake: NPO  Average Supplements Intake: NPO  Diet NPO  ADULT TUBE FEEDING; PEG; Standard with Fiber; Continuous; 10; Yes; 10; Q 4 hours; 40; 30; Q 4 hours  Current Tube Feeding (TF) Orders:  Feeding Route: PEG  Formula: Standard with Fiber (Jevity 1.2)  Schedule: Continuous  Feeding Regimen: Jevity 1.2 @ 40 ml/hr  Additives/Modulars: None  Water Flushes: 30 ml every 4 hours = 180 ml daily  Current TF & Flush Orders Provides: Jevity 1.2 @ 40 ml/hr = 1152 kcal, 53 g protein, 775 ml fluid daily (955 ml with flushes)  Goal TF & Flush Orders Provides: Jevity 1.2 bolus feedins of 237 ml five times

## 2024-06-15 LAB
ACE SERPL-CCNC: 41 U/L (ref 16–85)
GLUCOSE BLD-MCNC: 105 MG/DL (ref 65–105)

## 2024-06-15 PROCEDURE — 94761 N-INVAS EAR/PLS OXIMETRY MLT: CPT

## 2024-06-15 PROCEDURE — 6370000000 HC RX 637 (ALT 250 FOR IP): Performed by: INTERNAL MEDICINE

## 2024-06-15 PROCEDURE — 1200000000 HC SEMI PRIVATE

## 2024-06-15 PROCEDURE — 6370000000 HC RX 637 (ALT 250 FOR IP)

## 2024-06-15 PROCEDURE — 6360000002 HC RX W HCPCS: Performed by: INTERNAL MEDICINE

## 2024-06-15 PROCEDURE — 82947 ASSAY GLUCOSE BLOOD QUANT: CPT

## 2024-06-15 PROCEDURE — 94640 AIRWAY INHALATION TREATMENT: CPT

## 2024-06-15 PROCEDURE — 99232 SBSQ HOSP IP/OBS MODERATE 35: CPT | Performed by: INTERNAL MEDICINE

## 2024-06-15 RX ADMIN — PYRIDOSTIGMINE BROMIDE 60 MG: 60 TABLET ORAL at 20:27

## 2024-06-15 RX ADMIN — PYRIDOSTIGMINE BROMIDE 60 MG: 60 TABLET ORAL at 04:45

## 2024-06-15 RX ADMIN — METOPROLOL TARTRATE 25 MG: 25 TABLET, FILM COATED ORAL at 09:04

## 2024-06-15 RX ADMIN — HEPARIN SODIUM 5000 UNITS: 5000 INJECTION INTRAVENOUS; SUBCUTANEOUS at 20:27

## 2024-06-15 RX ADMIN — HEPARIN SODIUM 5000 UNITS: 5000 INJECTION INTRAVENOUS; SUBCUTANEOUS at 13:50

## 2024-06-15 RX ADMIN — ALBUTEROL SULFATE 2 PUFF: 90 AEROSOL, METERED RESPIRATORY (INHALATION) at 20:10

## 2024-06-15 RX ADMIN — ATORVASTATIN CALCIUM 20 MG: 20 TABLET, FILM COATED ORAL at 20:27

## 2024-06-15 RX ADMIN — BUDESONIDE AND FORMOTEROL FUMARATE DIHYDRATE 2 PUFF: 80; 4.5 AEROSOL RESPIRATORY (INHALATION) at 20:11

## 2024-06-15 RX ADMIN — METOPROLOL TARTRATE 25 MG: 25 TABLET, FILM COATED ORAL at 20:27

## 2024-06-15 RX ADMIN — PYRIDOSTIGMINE BROMIDE 60 MG: 60 TABLET ORAL at 15:18

## 2024-06-15 RX ADMIN — LANSOPRAZOLE 30 MG: 30 TABLET, ORALLY DISINTEGRATING, DELAYED RELEASE ORAL at 04:46

## 2024-06-15 RX ADMIN — BUDESONIDE AND FORMOTEROL FUMARATE DIHYDRATE 2 PUFF: 80; 4.5 AEROSOL RESPIRATORY (INHALATION) at 08:32

## 2024-06-15 RX ADMIN — HEPARIN SODIUM 5000 UNITS: 5000 INJECTION INTRAVENOUS; SUBCUTANEOUS at 04:45

## 2024-06-15 NOTE — PLAN OF CARE
Problem: Discharge Planning  Goal: Discharge to home or other facility with appropriate resources  6/14/2024 2152 by Karen Perez RN  Outcome: Progressing  6/14/2024 1208 by Karen Wells RN  Outcome: Progressing  Flowsheets (Taken 6/14/2024 0800)  Discharge to home or other facility with appropriate resources:   Identify barriers to discharge with patient and caregiver   Arrange for needed discharge resources and transportation as appropriate   Identify discharge learning needs (meds, wound care, etc)     Problem: Pain  Goal: Verbalizes/displays adequate comfort level or baseline comfort level  6/14/2024 2152 by Karen Perez RN  Outcome: Progressing  Flowsheets (Taken 6/14/2024 2035)  Verbalizes/displays adequate comfort level or baseline comfort level: Encourage patient to monitor pain and request assistance  6/14/2024 1208 by Karen Wells RN  Outcome: Progressing     Problem: ABCDS Injury Assessment  Goal: Absence of physical injury  6/14/2024 2152 by Karen Perez RN  Outcome: Progressing  Flowsheets (Taken 6/14/2024 2000)  Absence of Physical Injury: Implement safety measures based on patient assessment  6/14/2024 1208 by Karen Wells RN  Outcome: Progressing     Problem: Skin/Tissue Integrity  Goal: Absence of new skin breakdown  Description: 1.  Monitor for areas of redness and/or skin breakdown  2.  Assess vascular access sites hourly  3.  Every 4-6 hours minimum:  Change oxygen saturation probe site  4.  Every 4-6 hours:  If on nasal continuous positive airway pressure, respiratory therapy assess nares and determine need for appliance change or resting period.  6/14/2024 2152 by Karen Perez RN  Outcome: Progressing  6/14/2024 1208 by Karen Wells RN  Outcome: Progressing     Problem: Nutrition Deficit:  Goal: Optimize nutritional status  6/14/2024 2152 by Karen Perez RN  Outcome: Progressing  6/14/2024 1208 by Karen Wells RN  Outcome:  Progressing  Flowsheets (Taken 6/14/2024 0930 by Chica Page RD)  Nutrient intake appropriate for improving, restoring, or maintaining nutritional needs:   Assess nutritional status and recommend course of action   Recommend, monitor, and adjust tube feedings and TPN/PPN based on assessed needs     Problem: Safety - Adult  Goal: Free from fall injury  6/14/2024 2152 by Karen Perez RN  Outcome: Progressing  Flowsheets (Taken 6/14/2024 2000)  Free From Fall Injury: Instruct family/caregiver on patient safety  6/14/2024 1208 by Karen Wells RN  Outcome: Progressing     Problem: Respiratory - Adult  Goal: Achieves optimal ventilation and oxygenation  6/14/2024 2152 by Karen Perez RN  Outcome: Progressing  Flowsheets (Taken 6/14/2024 2105 by Santhosh Almanza RCP)  Achieves optimal ventilation and oxygenation:   Respiratory therapy support as indicated   Assess and instruct to report shortness of breath or any respiratory difficulty   Assess the need for suctioning and aspirate as needed   Encourage broncho-pulmonary hygiene including cough, deep breathe, incentive spirometry   Initiate smoking cessation protocol as indicated   Oxygen supplementation based on oxygen saturation or arterial blood gases   Position to facilitate oxygenation and minimize respiratory effort   Assess for changes in mentation and behavior   Assess for changes in respiratory status  6/14/2024 1208 by Karen Wells RN  Outcome: Progressing  6/14/2024 0843 by Anne Coronado RCP  Outcome: Progressing  Flowsheets (Taken 6/14/2024 0800 by Karen Wells, RN)  Achieves optimal ventilation and oxygenation:   Assess for changes in respiratory status   Assess for changes in mentation and behavior   Position to facilitate oxygenation and minimize respiratory effort     Problem: Neurosensory - Adult  Goal: Achieves stable or improved neurological status  6/14/2024 2152 by Karen Perez RN  Outcome:

## 2024-06-15 NOTE — PROGRESS NOTES
06/14/24 8228   Care Plan - Respiratory Goals   Achieves optimal ventilation and oxygenation Respiratory therapy support as indicated;Assess and instruct to report shortness of breath or any respiratory difficulty;Assess the need for suctioning and aspirate as needed;Encourage broncho-pulmonary hygiene including cough, deep breathe, incentive spirometry;Initiate smoking cessation protocol as indicated;Oxygen supplementation based on oxygen saturation or arterial blood gases;Position to facilitate oxygenation and minimize respiratory effort;Assess for changes in mentation and behavior;Assess for changes in respiratory status

## 2024-06-15 NOTE — CARE COORDINATION
Transitional Planning  PS Derek Love NP to inquire if Dr. Escalona will follow for tube feed after discharge as there is no record of patient being seen by Dr. Eid.      1005 Message from Derek Love NP, GI does not follow for home tube feeds.

## 2024-06-15 NOTE — PROGRESS NOTES
CLINICAL PHARMACY NOTE: MEDS TO BEDS    Total # of Prescriptions Filled: 5   The following medications were delivered to the patient:  Metoprolol tart.  Pyridostigimine  Atorvastatin  Aspirin  pantoprazole    Additional Documentation: cash payment   Prenatal visit: Doing ok. Baby martha Devi MD

## 2024-06-15 NOTE — DISCHARGE INSTR - DIET
Good nutrition is important when healing from an illness, injury, or surgery.  Follow any nutrition recommendations given to you during your hospital stay.   If you were given an oral nutrition supplement while in the hospital, continue to take this supplement at home.  You can take it with meals, in-between meals, and/or before bedtime. These supplements can be purchased at most local grocery stores, pharmacies, and chain [employer]stores.   If you have any questions about your diet or nutrition, call the hospital and ask for the dietitian.  Call your doctor if you have questions regarding your tube feeding

## 2024-06-15 NOTE — PROGRESS NOTES
[facility]  Office: [phone]  Dario Verma DO, Dustin Amador DO, Nathna De La Cruz DO, Juarez Su DO, April Zamorano MD, Erlinda Sanchez MD, Everette Harden MD, Bridget Rowe MD,  Celestino Smith MD, Arianna Lopes MD, Kade Joaquin MD,  Brigette Almonte DO, Kolton Linder MD, Vijay Childers MD, Timothy Verma DO, Jenn Camp MD,  Lawson Diallo DO, Lucy Liriano MD, Allyn Kruger MD, Loulou Resendiz MD, Luz Jordan MD,  Dick Stock MD, Shanon Ugalde MD, Vy Armendariz MD, Brennan Yo MD, Ramiro Kent MD, Alan Thompson MD, Niko King DO, Roberto Bhatt DO, Gianfranco Henriquez MD,  Fernandez Tilley MD, Shirley Waterhouse, CNP,  Yoanna Medina CNP, Juarez Webb, CNP,  Merary Crespo, DNP, Ilda Jean-Baptiste, CNP, Milagros Messina, CNP, Florence Saenz, CNP, Cristina Belle, CNP, Devika Olvera, PA-C, Mary Kirkpatrick PA-C, Cherry Watson, CNP, Erma Cifuentes, CNP, Benny Marie, CNP, Pebbles Proctor, CNP, Holly Jones, CNP, Altagracia Iglesias, CNS, Shannan Cruz, CNP, Erika Velez CNP, Tracy Schwab, CNP         [facility]   IN-PATIENT SERVICE   [facility]    Progress Note    6/15/2024    11:07 AM    Name:   Chayito Navas  MRN:     [id number]     Acct:      [id number]   Room:   0535/0535-01   Day:  6  Admit Date:  6/9/2024  1:32 PM    PCP:   Brijesh Perez MD  Code Status:  Full Code    Subjective:     C/C:   Chief Complaint   Patient presents with    Dysphagia     Interval History Status: not changed.     No issues overnight  Tolerating TF  DC planning in progress  No other complaints     Brief History:     Chayito Navas is a 78 y.o. female with past medical history of interstitial lung disease, rheumatoid arthritis, hypertension, asthma colon cancer in 2012 presented to the hospital for dysphagia. She was recently seen at [facility] and underwent EGD/Dilatation 5/24.per patient and family she has been experiencing difficulty swallowing  Severe malnutrition (HCC) 6/10/2024 Yes    Elevated troponin 6/10/2024 Yes    RA (rheumatoid arthritis) (HCC) 6/10/2024 Yes    Anemia associated with nutritional deficiency 6/10/2024 Yes    Malnutrition (HCC) 6/10/2024 Yes    Dysarthria 6/11/2024 Yes    Aspiration pneumonia of left lower lobe due to vomit (HCC) 6/12/2024 No    S/P percutaneous endoscopic gastrostomy (PEG) tube placement (HCC) 6/12/2024 Yes    Neuromuscular disease (HCC) 6/13/2024 Yes     Plan:        Chronic dysphagia / Bulbar palsy - s/p PEG tube placed and tolerating TF. Dietician consulted. aspiration precautions. ENT consulted - No oral/ oropharyngeal/ hypopharyngeal causes of dysphagia noted on examination and scope. Neurology consulted - myasthenia panel negative, EMG/NCS as outpatient. GI consulted - s/p PEG (6/11)  Aspiration pneumonia - on Levofloxacin . Afebrile.Aspiration precautions.  Elevated troponins/NSTEMI  - echo done and reported as normal. IV heparin stopped. Hold Asa for few days as she is s/p peg. Cardiology consulted - recommend OP stress test   Acute respiratory failure - resolved.  Severe malnutrition -  PEG tube for nutrition.  RA - follow up with rheumatologist OP  ILD - restart inhalers.  DVT prophylaxis - on SQ heparin.      Needs TF for greater than 90 days due to significant dysphagia and malnutrition.    Disposition - DC today - awaiting set up of home TF    Celestino Smith MD  6/15/2024  11:07 AM

## 2024-06-16 ENCOUNTER — APPOINTMENT (OUTPATIENT)
Dept: GENERAL RADIOLOGY | Age: 79
DRG: 056 | End: 2024-06-16
Payer: MEDICARE

## 2024-06-16 LAB
ANION GAP SERPL CALCULATED.3IONS-SCNC: 10 MMOL/L (ref 9–16)
BUN SERPL-MCNC: 18 MG/DL (ref 8–23)
CALCIUM SERPL-MCNC: 8.1 MG/DL (ref 8.6–10.4)
CHLORIDE SERPL-SCNC: 100 MMOL/L (ref 98–107)
CO2 SERPL-SCNC: 29 MMOL/L (ref 20–31)
CREAT SERPL-MCNC: 0.6 MG/DL (ref 0.5–0.9)
ERYTHROCYTE [DISTWIDTH] IN BLOOD BY AUTOMATED COUNT: 14.8 % (ref 11.8–14.4)
GFR, ESTIMATED: >90 ML/MIN/1.73M2
GLUCOSE BLD-MCNC: 109 MG/DL (ref 65–105)
GLUCOSE BLD-MCNC: 130 MG/DL (ref 65–105)
GLUCOSE BLD-MCNC: 132 MG/DL (ref 65–105)
GLUCOSE SERPL-MCNC: 88 MG/DL (ref 74–99)
HCT VFR BLD AUTO: 34.3 % (ref 36.3–47.1)
HGB BLD-MCNC: 10.4 G/DL (ref 11.9–15.1)
MCH RBC QN AUTO: 28.1 PG (ref 25.2–33.5)
MCHC RBC AUTO-ENTMCNC: 30.3 G/DL (ref 28.4–34.8)
MCV RBC AUTO: 92.7 FL (ref 82.6–102.9)
NRBC BLD-RTO: 0 PER 100 WBC
PLATELET # BLD AUTO: 257 K/UL (ref 138–453)
PMV BLD AUTO: 10.3 FL (ref 8.1–13.5)
POTASSIUM SERPL-SCNC: 3.6 MMOL/L (ref 3.7–5.3)
RBC # BLD AUTO: 3.7 M/UL (ref 3.95–5.11)
SODIUM SERPL-SCNC: 139 MMOL/L (ref 136–145)
WBC OTHER # BLD: 18.6 K/UL (ref 3.5–11.3)

## 2024-06-16 PROCEDURE — 1200000000 HC SEMI PRIVATE

## 2024-06-16 PROCEDURE — 6360000002 HC RX W HCPCS: Performed by: INTERNAL MEDICINE

## 2024-06-16 PROCEDURE — 6370000000 HC RX 637 (ALT 250 FOR IP)

## 2024-06-16 PROCEDURE — 6370000000 HC RX 637 (ALT 250 FOR IP): Performed by: INTERNAL MEDICINE

## 2024-06-16 PROCEDURE — 71045 X-RAY EXAM CHEST 1 VIEW: CPT

## 2024-06-16 PROCEDURE — 82947 ASSAY GLUCOSE BLOOD QUANT: CPT

## 2024-06-16 PROCEDURE — 36415 COLL VENOUS BLD VENIPUNCTURE: CPT

## 2024-06-16 PROCEDURE — 80048 BASIC METABOLIC PNL TOTAL CA: CPT

## 2024-06-16 PROCEDURE — 99232 SBSQ HOSP IP/OBS MODERATE 35: CPT | Performed by: INTERNAL MEDICINE

## 2024-06-16 PROCEDURE — 2700000000 HC OXYGEN THERAPY PER DAY

## 2024-06-16 PROCEDURE — 2580000003 HC RX 258: Performed by: INTERNAL MEDICINE

## 2024-06-16 PROCEDURE — 94761 N-INVAS EAR/PLS OXIMETRY MLT: CPT

## 2024-06-16 PROCEDURE — 94640 AIRWAY INHALATION TREATMENT: CPT

## 2024-06-16 PROCEDURE — 85027 COMPLETE CBC AUTOMATED: CPT

## 2024-06-16 RX ORDER — PROMETHAZINE HYDROCHLORIDE 25 MG/ML
6.25 INJECTION, SOLUTION INTRAMUSCULAR; INTRAVENOUS ONCE
Status: COMPLETED | OUTPATIENT
Start: 2024-06-16 | End: 2024-06-16

## 2024-06-16 RX ORDER — CLINDAMYCIN HYDROCHLORIDE 150 MG/1
300 CAPSULE ORAL EVERY 6 HOURS SCHEDULED
Status: DISCONTINUED | OUTPATIENT
Start: 2024-06-16 | End: 2024-06-18 | Stop reason: HOSPADM

## 2024-06-16 RX ADMIN — BUDESONIDE AND FORMOTEROL FUMARATE DIHYDRATE 2 PUFF: 80; 4.5 AEROSOL RESPIRATORY (INHALATION) at 20:06

## 2024-06-16 RX ADMIN — PYRIDOSTIGMINE BROMIDE 60 MG: 60 TABLET ORAL at 20:07

## 2024-06-16 RX ADMIN — ACETAMINOPHEN 650 MG: 325 TABLET ORAL at 00:29

## 2024-06-16 RX ADMIN — PYRIDOSTIGMINE BROMIDE 60 MG: 60 TABLET ORAL at 08:23

## 2024-06-16 RX ADMIN — HEPARIN SODIUM 5000 UNITS: 5000 INJECTION INTRAVENOUS; SUBCUTANEOUS at 08:24

## 2024-06-16 RX ADMIN — METOPROLOL TARTRATE 25 MG: 25 TABLET, FILM COATED ORAL at 08:24

## 2024-06-16 RX ADMIN — CLINDAMYCIN HYDROCHLORIDE 300 MG: 150 CAPSULE ORAL at 13:45

## 2024-06-16 RX ADMIN — ONDANSETRON 4 MG: 4 TABLET, ORALLY DISINTEGRATING ORAL at 00:48

## 2024-06-16 RX ADMIN — METOPROLOL TARTRATE 25 MG: 25 TABLET, FILM COATED ORAL at 20:07

## 2024-06-16 RX ADMIN — CLINDAMYCIN HYDROCHLORIDE 300 MG: 150 CAPSULE ORAL at 18:40

## 2024-06-16 RX ADMIN — PROMETHAZINE HYDROCHLORIDE 6.25 MG: 25 INJECTION INTRAMUSCULAR; INTRAVENOUS at 18:38

## 2024-06-16 RX ADMIN — PYRIDOSTIGMINE BROMIDE 60 MG: 60 TABLET ORAL at 13:45

## 2024-06-16 RX ADMIN — ONDANSETRON 4 MG: 4 TABLET, ORALLY DISINTEGRATING ORAL at 12:40

## 2024-06-16 RX ADMIN — LANSOPRAZOLE 30 MG: 30 TABLET, ORALLY DISINTEGRATING, DELAYED RELEASE ORAL at 08:23

## 2024-06-16 RX ADMIN — SODIUM CHLORIDE, PRESERVATIVE FREE 10 ML: 5 INJECTION INTRAVENOUS at 08:25

## 2024-06-16 RX ADMIN — HEPARIN SODIUM 5000 UNITS: 5000 INJECTION INTRAVENOUS; SUBCUTANEOUS at 20:08

## 2024-06-16 RX ADMIN — HEPARIN SODIUM 5000 UNITS: 5000 INJECTION INTRAVENOUS; SUBCUTANEOUS at 13:45

## 2024-06-16 RX ADMIN — ATORVASTATIN CALCIUM 20 MG: 20 TABLET, FILM COATED ORAL at 20:08

## 2024-06-16 RX ADMIN — SODIUM CHLORIDE, PRESERVATIVE FREE 10 ML: 5 INJECTION INTRAVENOUS at 20:18

## 2024-06-16 NOTE — PLAN OF CARE
Problem: Discharge Planning  Goal: Discharge to home or other facility with appropriate resources  Outcome: Progressing     Problem: Pain  Goal: Verbalizes/displays adequate comfort level or baseline comfort level  Outcome: Progressing     Problem: ABCDS Injury Assessment  Goal: Absence of physical injury  Outcome: Progressing  Flowsheets (Taken 6/15/2024 1056 by Karen Wells, RN)  Absence of Physical Injury: Implement safety measures based on patient assessment     Problem: Skin/Tissue Integrity  Goal: Absence of new skin breakdown  Description: 1.  Monitor for areas of redness and/or skin breakdown  2.  Assess vascular access sites hourly  3.  Every 4-6 hours minimum:  Change oxygen saturation probe site  4.  Every 4-6 hours:  If on nasal continuous positive airway pressure, respiratory therapy assess nares and determine need for appliance change or resting period.  Outcome: Progressing     Problem: Nutrition Deficit:  Goal: Optimize nutritional status  Outcome: Progressing     Problem: Safety - Adult  Goal: Free from fall injury  Outcome: Progressing  Flowsheets (Taken 6/15/2024 1056 by Karen Wells, RN)  Free From Fall Injury:   Instruct family/caregiver on patient safety   Based on caregiver fall risk screen, instruct family/caregiver to ask for assistance with transferring infant if caregiver noted to have fall risk factors     Problem: Respiratory - Adult  Goal: Achieves optimal ventilation and oxygenation  Outcome: Progressing     Problem: Neurosensory - Adult  Goal: Achieves stable or improved neurological status  Outcome: Progressing  Goal: Achieves maximal functionality and self care  Outcome: Progressing     Problem: Gastrointestinal - Adult  Goal: Minimal or absence of nausea and vomiting  Outcome: Progressing  Goal: Maintains or returns to baseline bowel function  Outcome: Progressing  Goal: Maintains adequate nutritional intake  Outcome: Progressing  Goal: Establish and maintain optimal ostomy

## 2024-06-16 NOTE — PROGRESS NOTES
[facility]  Office: [phone]  Dario Verma DO, Dustin Amador DO, Nathan De La Cruz DO, Juarez Su DO, April Zamorano MD, Erlinda Sanchez MD, Everette Harden MD, Bridget Rowe MD,  Celestino Smith MD, Arianna Lopes MD, Kade Joaquin MD,  Brigette Almonte DO, Kolton Linder MD, Vijay Childers MD, Timothy Verma DO, Jenn Camp MD,  Lawson Diallo DO, Lucy Liriano MD, Allyn Kruger MD, Loulou Resendiz MD, Luz Jordan MD,  Dick Stock MD, Shanon Ugalde MD, Vy Armendariz MD, Brennan Yo MD, Ramiro Kent MD, Alan Thompson MD, Niko King DO, Roberto Bhatt DO, Gianfranco Henriquez MD,  Fernandez Tilley MD, Shirley Waterhouse, CNP,  Yoanna Medina CNP, Juarez Webb, CNP,  Merary Crespo, DNP, Ilda Jean-Baptiste, CNP, Milagros Messina, CNP, Florence Saenz, CNP, Cristina Belle, CNP, Devika Olvera, PA-C, Mary Kirkpatrick PA-C, Cherry Watson, CNP, Erma Cifuentes, CNP, Benny Marie, CNP, Pebbles Proctor, CNP, Holly Jones, CNP, Altagracia Iglesias, CNS, Shannan Cruz, CNP, Erika Velez CNP, Tracy Schwab, CNP         [facility]   IN-PATIENT SERVICE   [facility]    Progress Note    6/16/2024    11:57 AM    Name:   Chayito Navas  MRN:     [id number]     Acct:      [id number]   Room:   0535/0535-01   Day:  7  Admit Date:  6/9/2024  1:32 PM    PCP:   Brijesh Perez MD  Code Status:  Full Code    Subjective:     C/C:   Chief Complaint   Patient presents with    Dysphagia     Interval History Status: not changed.     Febrile overnight  Issues with coughing yesterday  No other complaints  Awaiting set up of home care and tube feeds     Brief History:     Chayito Navas is a 78 y.o. female with history of interstitial lung disease, rheumatoid arthritis, hypertension, asthma colon cancer in 2012 presented to the hospital for dysphagia. She was recently seen at [facility] and underwent EGD/Dilatation 5/24.per patient and family she has been experiencing  difficulty swallowing and subsequent documented 60 pounds weight loss since last 2 years almost and got worse over the last 6 months.  It has been progressive initially to solids and now to liquids.  Her previous testing includes abnormal swallow study by SLP which showed smal narrowing at lower cervical upper thoracic and cervical level, small hiatal hernia with Schatzki's ring and esophageal dysmotility.  An EGD 9/23 showed GERD, esophageal dysmotility, esophageal narrowing and Schatzki's rings was dilated. Swallow motility function test showed substantial delay of the entire swallowing process as well as stasis of content in the vallecula and proximal esophagus. Manometry 1/15 was normal with good motility.  Manometry 1/24 showed normal good motility.  Her myasthenia gravis screen was negative.  She was recommended EGD with bougie dilatation and for that she was referred to [facility] underwent EGD/Dil in 5/24 without any improvement.    ENT/Neurology consulted. CT soft tissue neck, chest which were unremarkable. Myasthenia gravis ruled out. GI consulted, underwent PEG placement on 6/11. Started on tube feeds. ENT has performed laryngoscopy with obvious cause for dysphagia. Had episode of aspiration, started on antibiotics.     Prolonged admission awaiting set up of home tube feeds     Review of Systems:     Constitutional:  negative for chills, fevers, sweats, positive for fatigue   Respiratory:  negative for cough, dyspnea on exertion, shortness of breath, wheezing  Cardiovascular:  negative for chest pain, chest pressure/discomfort, lower extremity edema, palpitations  Gastrointestinal:  positive for dysphagia  Neurological:  negative for dizziness, headache    Medications:     Allergies:    Allergies   Allergen Reactions    Pcn [Penicillins] Rash       Current Meds:   Scheduled Meds:    lansoprazole  30 mg Per G Tube QAM AC    pyRIDostigmine  60 mg Oral 3 times per day    metoprolol tartrate  25

## 2024-06-16 NOTE — CARE COORDINATION
Transitional Planning  0800 PS to Dr. Mckeon inquiring if neuro service could manage tube feeds outpatient.      0830 PC to Nikki at [facility], left a  for a return call.     1620 PS Dr. Smith regarding following TF as outpatient. Will await reply.     1626 Dr. Smith not able to follow.

## 2024-06-17 LAB
ERYTHROCYTE [DISTWIDTH] IN BLOOD BY AUTOMATED COUNT: 14.9 % (ref 11.8–14.4)
GLUCOSE BLD-MCNC: 102 MG/DL (ref 65–105)
GLUCOSE BLD-MCNC: 122 MG/DL (ref 65–105)
GLUCOSE BLD-MCNC: 124 MG/DL (ref 65–105)
GLUCOSE BLD-MCNC: 143 MG/DL (ref 65–105)
HCT VFR BLD AUTO: 33.6 % (ref 36.3–47.1)
HGB BLD-MCNC: 10.3 G/DL (ref 11.9–15.1)
MCH RBC QN AUTO: 27.5 PG (ref 25.2–33.5)
MCHC RBC AUTO-ENTMCNC: 30.7 G/DL (ref 28.4–34.8)
MCV RBC AUTO: 89.8 FL (ref 82.6–102.9)
NRBC BLD-RTO: 0 PER 100 WBC
PLATELET # BLD AUTO: 277 K/UL (ref 138–453)
PMV BLD AUTO: 10.5 FL (ref 8.1–13.5)
RBC # BLD AUTO: 3.74 M/UL (ref 3.95–5.11)
WBC OTHER # BLD: 17.9 K/UL (ref 3.5–11.3)

## 2024-06-17 PROCEDURE — 97110 THERAPEUTIC EXERCISES: CPT

## 2024-06-17 PROCEDURE — 97116 GAIT TRAINING THERAPY: CPT

## 2024-06-17 PROCEDURE — 2580000003 HC RX 258: Performed by: INTERNAL MEDICINE

## 2024-06-17 PROCEDURE — 6360000002 HC RX W HCPCS: Performed by: INTERNAL MEDICINE

## 2024-06-17 PROCEDURE — 36415 COLL VENOUS BLD VENIPUNCTURE: CPT

## 2024-06-17 PROCEDURE — 6370000000 HC RX 637 (ALT 250 FOR IP): Performed by: INTERNAL MEDICINE

## 2024-06-17 PROCEDURE — 94761 N-INVAS EAR/PLS OXIMETRY MLT: CPT

## 2024-06-17 PROCEDURE — 94640 AIRWAY INHALATION TREATMENT: CPT

## 2024-06-17 PROCEDURE — 6370000000 HC RX 637 (ALT 250 FOR IP)

## 2024-06-17 PROCEDURE — 85027 COMPLETE CBC AUTOMATED: CPT

## 2024-06-17 PROCEDURE — 82947 ASSAY GLUCOSE BLOOD QUANT: CPT

## 2024-06-17 PROCEDURE — 97535 SELF CARE MNGMENT TRAINING: CPT

## 2024-06-17 PROCEDURE — 1200000000 HC SEMI PRIVATE

## 2024-06-17 PROCEDURE — 99232 SBSQ HOSP IP/OBS MODERATE 35: CPT | Performed by: INTERNAL MEDICINE

## 2024-06-17 RX ADMIN — CLINDAMYCIN HYDROCHLORIDE 300 MG: 150 CAPSULE ORAL at 06:38

## 2024-06-17 RX ADMIN — CLINDAMYCIN HYDROCHLORIDE 300 MG: 150 CAPSULE ORAL at 18:03

## 2024-06-17 RX ADMIN — HEPARIN SODIUM 5000 UNITS: 5000 INJECTION INTRAVENOUS; SUBCUTANEOUS at 13:48

## 2024-06-17 RX ADMIN — PYRIDOSTIGMINE BROMIDE 60 MG: 60 TABLET ORAL at 12:21

## 2024-06-17 RX ADMIN — METOPROLOL TARTRATE 25 MG: 25 TABLET, FILM COATED ORAL at 22:01

## 2024-06-17 RX ADMIN — CLINDAMYCIN HYDROCHLORIDE 300 MG: 150 CAPSULE ORAL at 22:14

## 2024-06-17 RX ADMIN — PYRIDOSTIGMINE BROMIDE 60 MG: 60 TABLET ORAL at 06:39

## 2024-06-17 RX ADMIN — ATORVASTATIN CALCIUM 20 MG: 20 TABLET, FILM COATED ORAL at 22:01

## 2024-06-17 RX ADMIN — HEPARIN SODIUM 5000 UNITS: 5000 INJECTION INTRAVENOUS; SUBCUTANEOUS at 06:39

## 2024-06-17 RX ADMIN — CLINDAMYCIN HYDROCHLORIDE 300 MG: 150 CAPSULE ORAL at 01:01

## 2024-06-17 RX ADMIN — HEPARIN SODIUM 5000 UNITS: 5000 INJECTION INTRAVENOUS; SUBCUTANEOUS at 22:01

## 2024-06-17 RX ADMIN — SODIUM CHLORIDE, PRESERVATIVE FREE 10 ML: 5 INJECTION INTRAVENOUS at 22:01

## 2024-06-17 RX ADMIN — BUDESONIDE AND FORMOTEROL FUMARATE DIHYDRATE 2 PUFF: 80; 4.5 AEROSOL RESPIRATORY (INHALATION) at 20:43

## 2024-06-17 RX ADMIN — ONDANSETRON 4 MG: 4 TABLET, ORALLY DISINTEGRATING ORAL at 10:03

## 2024-06-17 RX ADMIN — BUDESONIDE AND FORMOTEROL FUMARATE DIHYDRATE 2 PUFF: 80; 4.5 AEROSOL RESPIRATORY (INHALATION) at 08:35

## 2024-06-17 RX ADMIN — METOPROLOL TARTRATE 25 MG: 25 TABLET, FILM COATED ORAL at 09:48

## 2024-06-17 RX ADMIN — PYRIDOSTIGMINE BROMIDE 60 MG: 60 TABLET ORAL at 22:02

## 2024-06-17 RX ADMIN — LANSOPRAZOLE 30 MG: 30 TABLET, ORALLY DISINTEGRATING, DELAYED RELEASE ORAL at 09:49

## 2024-06-17 RX ADMIN — SODIUM CHLORIDE, PRESERVATIVE FREE 5 ML: 5 INJECTION INTRAVENOUS at 09:48

## 2024-06-17 RX ADMIN — CLINDAMYCIN HYDROCHLORIDE 300 MG: 150 CAPSULE ORAL at 12:21

## 2024-06-17 NOTE — PROGRESS NOTES
training, Home exercise program  Safety Devices  Type of Devices: Call light within reach, Gait belt, Left in chair, Nurse notified  Restraints  Restraints Initially in Place: No     Restrictions  Restrictions/Precautions  Restrictions/Precautions: Up as Tolerated  Required Braces or Orthoses?: No  Position Activity Restriction  Other position/activity restrictions: s/p PEG placement (6/11/24)     Subjective   General  Patient assessed for rehabilitation services?: Yes  Response To Previous Treatment: Patient with no complaints from previous session.  Family / Caregiver Present: No  Follows Commands: Within Functional Limits  General Comment  Comments: Pt retired to seated in chair at end of session with call light in reach.  Subjective  Subjective: Pt and RN agreeable to PT this morning. Pt supine in bed upon arrival with no c/o pain. Pt pleasant and cooperative throughout session.       Cognition   Orientation  Overall Orientation Status: Within Functional Limits  Cognition  Overall Cognitive Status: WFL     Objective   Bed mobility  Supine to Sit: Stand by assistance  Sit to Supine: Unable to assess (Pt retired to seated in chair at end of session.)  Scooting: Stand by assistance  Bed Mobility Comments: HOB elevated, able to perform supine to sit transfer with SBA.  Transfers  Sit to Stand: Stand by assistance  Stand to Sit: Stand by assistance  Comment: Transfers performed x1 from EOB and x1 from toilet with no AD, SBA.  Ambulation  Surface: Level tile  Device: Rolling Walker  Assistance: Stand by assistance  Quality of Gait: fair stability, no LOB  Gait Deviations: Slow Mat  Distance: 200ft  Comments: Slow, however, steady mat. No LOB noted throughout session.  More Ambulation?: Yes  Ambulation 2  Surface - 2: level tile  Device 2: No device  Assistance 2: Stand by assistance  Quality of Gait 2: fair stability, no LOB  Gait Deviations: Slow Mat;Decreased step length;Decreased step height  Distance:  understanding;Demonstrated understanding      Therapy Time   Individual Concurrent Group Co-treatment   Time In 1053         Time Out 1125         Minutes 32         Timed Code Treatment Minutes: 23 Minutes       Toshia Collins PTA

## 2024-06-17 NOTE — PROGRESS NOTES
Comprehensive Nutrition Assessment    Type and Reason for Visit:  Reassess    Nutrition Recommendations/Plan:   Recommend continue current bolus TF regimen  TF Jevity 1.2 237 mL bolus 5x/day + flush 50 mL pre/post feeding  Continue to monitor for TF tolerance, weight, labs, plan of care     Malnutrition Assessment:  Malnutrition Status:  Severe malnutrition (06/10/24 0946)    Context:  Chronic Illness     6/10/24    Nutrition Assessment:    Pt is now tolerating bolus TF order. TF meets estimated nutrition needs. Awaiting set up of homecare TF for d/c.    Nutrition Related Findings:    No new weights since previous assessment. Labs 6/16/24: K 3.6 (L), POC -143 x 24 hrs. Wound Type: Surgical Incision       Current Nutrition Intake & Therapies:    Average Meal Intake: NPO  Average Supplements Intake: NPO  Current Tube Feeding (TF) Orders:  Feeding Route: PEG/J  Formula: Standard with Fiber  Schedule: Bolus  Feeding Regimen: Jevity 1.2 bolus feedings of 237 mL 5x/day  Additives/Modulars: None  Water Flushes: flush 50 mL pre/post feeding  Current & Goal TF & Flush Orders Provides: provides 1425 kcal, 66 g protein, 955 ml fluid daily    Anthropometric Measures:  Height: 149 cm (4' 10.66\")  Ideal Body Weight (IBW): 93 lbs (42 kg)    Admission Body Weight: 40 kg (88 lb 2.9 oz)  Current Body Weight: 39.5 kg (87 lb 1.3 oz), 93.6 % IBW. Weight Source: Bed Scale  Current BMI (kg/m2): 17.8  Usual Body Weight: 67 kg (147 lb 11.3 oz) (based on 60# wt loss)  % Weight Change (Calculated): -40.6  Weight Adjustment For: No Adjustment     BMI Categories: Underweight (BMI less than 18.5)    Estimated Daily Nutrient Needs:  Energy Requirements Based On: Kcal/kg  Weight Used for Energy Requirements: Current  Energy (kcal/day): 8865-2806 kcal/day (30-35 kcal/kg)  Weight Used for Protein Requirements: Current  Protein (g/day): 55-65 g/day  Method Used for Fluid Requirements: ml/Kg  Fluid (ml/day): 0397-9935 ml/day    Nutrition

## 2024-06-17 NOTE — PLAN OF CARE
Problem: Respiratory - Adult  Goal: Achieves optimal ventilation and oxygenation  6/17/2024 0837 by Kellie Figueredo, [facility]ADFORD  Outcome: Progressing

## 2024-06-17 NOTE — PLAN OF CARE
Problem: Discharge Planning  Goal: Discharge to home or other facility with appropriate resources  Outcome: Progressing     Problem: Pain  Goal: Verbalizes/displays adequate comfort level or baseline comfort level  Outcome: Progressing     Problem: ABCDS Injury Assessment  Goal: Absence of physical injury  Outcome: Progressing     Problem: Skin/Tissue Integrity  Goal: Absence of new skin breakdown  Description: 1.  Monitor for areas of redness and/or skin breakdown  2.  Assess vascular access sites hourly  3.  Every 4-6 hours minimum:  Change oxygen saturation probe site  4.  Every 4-6 hours:  If on nasal continuous positive airway pressure, respiratory therapy assess nares and determine need for appliance change or resting period.  Outcome: Progressing     Problem: Nutrition Deficit:  Goal: Optimize nutritional status  Outcome: Progressing     Problem: Safety - Adult  Goal: Free from fall injury  Outcome: Progressing     Problem: Respiratory - Adult  Goal: Achieves optimal ventilation and oxygenation  Outcome: Progressing     Problem: Neurosensory - Adult  Goal: Achieves stable or improved neurological status  Outcome: Progressing  Goal: Achieves maximal functionality and self care  Outcome: Progressing     Problem: Gastrointestinal - Adult  Goal: Minimal or absence of nausea and vomiting  Outcome: Progressing  Flowsheets (Taken 6/16/2024 1600 by Karen Wells, RN)  Minimal or absence of nausea and vomiting:   Administer IV fluids as ordered to ensure adequate hydration   Maintain NPO status until nausea and vomiting are resolved   Nasogastric tube to low intermittent suction as ordered   Administer ordered antiemetic medications as needed  Goal: Maintains or returns to baseline bowel function  Outcome: Progressing  Flowsheets (Taken 6/16/2024 1600 by Karen Wells, RN)  Maintains or returns to baseline bowel function:   Assess bowel function   Encourage oral fluids to ensure adequate hydration  Goal: Maintains

## 2024-06-17 NOTE — CARE COORDINATION
Transitional planning-called Dr. Eid([phone]) GI doctor to follow TF- office closed. Couldn't leave message.    1210 called Dr. Arnett's office-patient's PCP to see if he will follow for the home care and tube feed-left message. Called Dr. Eid's office-was on hold for 5 Minutes-no answer.    1130 PS Kym Finnegan and Derek Love to see if Dr. Escalona would follow the TF at home for the patient.    1555 called Dr. Arnett's office-he will follow the tube feed. He will sign orders from the dietician. Phone number [phone]. Fax number [phone]. Address [address] [place] [address]. [place] [address]. Called Nikki with [facility]-she might have tube feed for tomorrow.    161 faxed clinicals to Dr. Arnett's Office [phone]

## 2024-06-17 NOTE — PLAN OF CARE
Problem: Discharge Planning  Goal: Discharge to home or other facility with appropriate resources  Outcome: Progressing     Problem: Pain  Goal: Verbalizes/displays adequate comfort level or baseline comfort level  Outcome: Progressing     Problem: ABCDS Injury Assessment  Goal: Absence of physical injury  Outcome: Progressing     Problem: Skin/Tissue Integrity  Goal: Absence of new skin breakdown  Description: 1.  Monitor for areas of redness and/or skin breakdown  2.  Assess vascular access sites hourly  3.  Every 4-6 hours minimum:  Change oxygen saturation probe site  4.  Every 4-6 hours:  If on nasal continuous positive airway pressure, respiratory therapy assess nares and determine need for appliance change or resting period.  Outcome: Progressing     Problem: Nutrition Deficit:  Goal: Optimize nutritional status  Outcome: Progressing  Flowsheets (Taken 6/17/2024 1434 by Ramila Sanchez RD)  Nutrient intake appropriate for improving, restoring, or maintaining nutritional needs:   Assess nutritional status and recommend course of action   Recommend, monitor, and adjust tube feedings and TPN/PPN based on assessed needs   Monitor oral intake, labs, and treatment plans     Problem: Safety - Adult  Goal: Free from fall injury  Outcome: Progressing

## 2024-06-17 NOTE — PROGRESS NOTES
[facility]  Office: [phone]  Dario Verma DO, Dustin Amador DO, Nathan De La Cruz DO, Juarez Su DO, April Zamorano MD, Erlinda Sanchez MD, Everette Harden MD, Bridget Rowe MD,  Celestino Smith MD, Arianna Lopes MD, Kade Joaquin MD,  Brigette Almonte DO, Kolton Linder MD, Vijay Childers MD, Timothy Verma DO, Jenn Camp MD,  Lawson Diallo DO, Lucy Liriano MD, Allyn Kruger MD, Loulou Resenidz MD, Luz Jordan MD,  Dick Stock MD, Shanon Ugalde MD, Vy Armendariz MD, Brennan Yo MD, Ramiro Kent MD, Alan Thompson MD, Niko King DO, Roberto Bhatt DO, Gianfranco Henriquez MD,  Fernandez Tilley MD, Shirley Waterhouse, CNP,  Yoanna Medina CNP, Juarez Webb, CNP,  Merary Crespo, DNP, Ilda Jean-Baptiste, CNP, Milagros Messina, CNP, Florence Saenz, CNP, Cristina Belle, CNP, Devika Olvera, PA-C, Mary Kirkpatrick PA-C, Cherry Watson, CNP, Erma Cifunetes, CNP, Benny Marie, CNP, Pebbles Proctor, CNP, Holly Jones, CNP, Altagracia Iglesias, CNS, Shannan Cruz, CNP, Erika Velez CNP, Tracy Schwab, CNP         [facility]   IN-PATIENT SERVICE   [facility]    Progress Note    6/17/2024    10:39 AM    Name:   Chayito Navas  MRN:     [id number]     Acct:      [id number]   Room:   0535/0535-01   Day:  8  Admit Date:  6/9/2024  1:32 PM    PCP:   Brijesh Perez MD  Code Status:  Full Code    Subjective:     C/C:   Chief Complaint   Patient presents with    Dysphagia     Interval History Status: not changed.     No issues overnight  Awaiting set up of home tube feeds  Discussed with CM  No other complaints     Brief History:     Chayito Navas is a 78 y.o. female with history of interstitial lung disease, rheumatoid arthritis, hypertension, asthma colon cancer in 2012 presented to the hospital for dysphagia. She was recently seen at [facility] and underwent EGD/Dilatation 5/24.per patient and family she has been experiencing difficulty

## 2024-06-17 NOTE — PROGRESS NOTES
Occupational Therapy  Facility/Department: [address] STEPDOWN  Occupational Therapy Daily Treatment Note    Name: Chayito Navas  : 1945  MRN: [id number]  Date of Service: 2024    Discharge Recommendations:  Patient would benefit from continued therapy after discharge     Patient Diagnosis(es): The primary encounter diagnosis was Dysphagia, unspecified type. Diagnoses of Weight loss, Elevated troponin, NSTEMI (non-ST elevated myocardial infarction) (HCC), Bulbar palsy (HCC), and Dysarthria were also pertinent to this visit.  Past Medical History:  has no past medical history on file.  Past Surgical History:  has a past surgical history that includes Esophagogastroduodenoscopy w/ PEG (2024) and Upper gastrointestinal endoscopy (N/A, 2024).       Assessment   Performance deficits / Impairments: Decreased functional mobility ;Decreased ADL status;Decreased endurance;Decreased high-level IADLs;Decreased balance  Assessment: pt would benefit from continued acute OT in order to increase safety and independence with ADLs and functional transfers/functionla mobility.  Prognosis: Good  Activity Tolerance  Activity Tolerance: Patient Tolerated treatment well        Plan   Occupational Therapy Plan  Times Per Week: 2-3x/wl  Current Treatment Recommendations: Strengthening, Balance training, Functional mobility training, Endurance training, Safety education & training, Self-Care / ADL, Equipment evaluation, education, & procurement     Restrictions  Restrictions/Precautions  Restrictions/Precautions: Up as Tolerated  Required Braces or Orthoses?: No  Position Activity Restriction  Other position/activity restrictions: s/p PEG placement (24)    Subjective   General  Patient assessed for rehabilitation services?: Yes  Response to previous treatment: Patient with no complaints from previous session  Family / Caregiver Present: No  Diagnosis: dysphagia  General Comment  Comments: RN ok'd patient for therapy

## 2024-06-18 VITALS
HEART RATE: 72 BPM | BODY MASS INDEX: 17.54 KG/M2 | HEIGHT: 59 IN | WEIGHT: 87 LBS | OXYGEN SATURATION: 98 % | DIASTOLIC BLOOD PRESSURE: 56 MMHG | TEMPERATURE: 97.4 F | RESPIRATION RATE: 18 BRPM | SYSTOLIC BLOOD PRESSURE: 113 MMHG

## 2024-06-18 LAB
GLUCOSE BLD-MCNC: 142 MG/DL (ref 65–105)
GLUCOSE BLD-MCNC: 143 MG/DL (ref 65–105)

## 2024-06-18 PROCEDURE — 6370000000 HC RX 637 (ALT 250 FOR IP): Performed by: INTERNAL MEDICINE

## 2024-06-18 PROCEDURE — 94640 AIRWAY INHALATION TREATMENT: CPT

## 2024-06-18 PROCEDURE — 6370000000 HC RX 637 (ALT 250 FOR IP)

## 2024-06-18 PROCEDURE — 99239 HOSP IP/OBS DSCHRG MGMT >30: CPT | Performed by: INTERNAL MEDICINE

## 2024-06-18 PROCEDURE — 82947 ASSAY GLUCOSE BLOOD QUANT: CPT

## 2024-06-18 PROCEDURE — 6360000002 HC RX W HCPCS: Performed by: INTERNAL MEDICINE

## 2024-06-18 PROCEDURE — 2580000003 HC RX 258: Performed by: INTERNAL MEDICINE

## 2024-06-18 RX ORDER — PYRIDOSTIGMINE BROMIDE 60 MG/1
60 TABLET ORAL EVERY 8 HOURS SCHEDULED
Qty: 60 TABLET | Refills: 1
Start: 2024-06-18

## 2024-06-18 RX ORDER — ATORVASTATIN CALCIUM 20 MG/1
20 TABLET, FILM COATED ORAL NIGHTLY
Qty: 30 TABLET | Refills: 3
Start: 2024-06-18

## 2024-06-18 RX ORDER — ASPIRIN 81 MG/1
81 TABLET, CHEWABLE ORAL DAILY
Qty: 30 TABLET | Refills: 0
Start: 2024-06-18

## 2024-06-18 RX ADMIN — BUDESONIDE AND FORMOTEROL FUMARATE DIHYDRATE 2 PUFF: 80; 4.5 AEROSOL RESPIRATORY (INHALATION) at 07:57

## 2024-06-18 RX ADMIN — HEPARIN SODIUM 5000 UNITS: 5000 INJECTION INTRAVENOUS; SUBCUTANEOUS at 06:21

## 2024-06-18 RX ADMIN — CLINDAMYCIN HYDROCHLORIDE 300 MG: 150 CAPSULE ORAL at 11:39

## 2024-06-18 RX ADMIN — PYRIDOSTIGMINE BROMIDE 60 MG: 60 TABLET ORAL at 06:21

## 2024-06-18 RX ADMIN — LANSOPRAZOLE 30 MG: 30 TABLET, ORALLY DISINTEGRATING, DELAYED RELEASE ORAL at 08:36

## 2024-06-18 RX ADMIN — SODIUM CHLORIDE, PRESERVATIVE FREE 10 ML: 5 INJECTION INTRAVENOUS at 08:36

## 2024-06-18 RX ADMIN — CLINDAMYCIN HYDROCHLORIDE 300 MG: 150 CAPSULE ORAL at 06:21

## 2024-06-18 RX ADMIN — METOPROLOL TARTRATE 25 MG: 25 TABLET, FILM COATED ORAL at 08:35

## 2024-06-18 NOTE — PROGRESS NOTES
Patient discharged home with family. Patient and patients daughter Vanessa (via phone) were educated on discharge instructions and verbalized understanding. A copy of AVS was placed in patient bag. All questions were answered at this time. Patient was sent with two additional bottles of tube feed as well as two irrigation kits. Patients medications were delivered via meds to beds and placed in patient belongings. All belongings were collected and accounted for. Patient was taken off unit in wheelchair and left in private vehicle.

## 2024-06-18 NOTE — CARE COORDINATION
Discharge Report    [facility]  Clinical Case Management Department  Written by: Bailey Dominique RN    Patient Name: Chayito Navas  Attending Provider: No att. providers found  Admit Date: 2024  1:32 PM  MRN: [id number]  Account: [id number]                     : 1945  Discharge Date: 2024      Disposition: home with [facility].    Bailey Dominique RN

## 2024-06-18 NOTE — PLAN OF CARE
Problem: Discharge Planning  Goal: Discharge to home or other facility with appropriate resources  6/18/2024 1118 by Marissa Moses RN  Outcome: Completed  6/18/2024 1118 by Marissa Moses RN  Outcome: Completed     Problem: Pain  Goal: Verbalizes/displays adequate comfort level or baseline comfort level  6/18/2024 1118 by Marissa Moses RN  Outcome: Completed  6/18/2024 1118 by Marissa Moses RN  Outcome: Completed     Problem: ABCDS Injury Assessment  Goal: Absence of physical injury  Outcome: Completed     Problem: Skin/Tissue Integrity  Goal: Absence of new skin breakdown  Description: 1.  Monitor for areas of redness and/or skin breakdown  2.  Assess vascular access sites hourly  3.  Every 4-6 hours minimum:  Change oxygen saturation probe site  4.  Every 4-6 hours:  If on nasal continuous positive airway pressure, respiratory therapy assess nares and determine need for appliance change or resting period.  Outcome: Completed     Problem: Nutrition Deficit:  Goal: Optimize nutritional status  Outcome: Completed     Problem: Safety - Adult  Goal: Free from fall injury  Outcome: Completed     Problem: Respiratory - Adult  Goal: Achieves optimal ventilation and oxygenation  Outcome: Completed     Problem: Neurosensory - Adult  Goal: Achieves stable or improved neurological status  Outcome: Completed  Goal: Achieves maximal functionality and self care  Outcome: Completed     Problem: Gastrointestinal - Adult  Goal: Minimal or absence of nausea and vomiting  Outcome: Completed  Goal: Maintains or returns to baseline bowel function  Outcome: Completed  Goal: Maintains adequate nutritional intake  Outcome: Completed  Goal: Establish and maintain optimal ostomy function  Outcome: Completed

## 2024-06-18 NOTE — CARE COORDINATION
Transitional planning-called [facility]-informed them of patient's discharge. Called Nikki at [facility] earlier-OK to send patient home.

## 2024-06-18 NOTE — PROGRESS NOTES
[facility]  Office: [phone]  Dario Verma DO, Dustin Amador DO, Nathan De La Cruz DO, Juarez Su DO, April Zamorano MD, Erlinda Sanchez MD, Everette Harden MD, Bridget Rowe MD,  Celestino Smith MD, Arianna Lopes MD, Kade Joaquin MD,  Brigette Almonte DO, Kolton Linder MD, Vijay Childers MD, Timothy Verma DO, Jenn Camp MD,  Lawson Diallo DO, Lucy Liriano MD, Allyn Kruger MD, Loulou Resendiz MD, Luz Jordan MD,  Dick Stock MD, Shanon Ugalde MD, Vy Armendariz MD, Brennan Yo MD, Ramiro Kent MD, Alan Thompson MD, Niko King DO, Roberto Bhatt DO, Gianfranco Henriquez MD,  Fernandez Tilley MD, Shirley Waterhouse, CNP,  Yoanna Medina CNP, Juarez Webb, CNP,  Merary Crespo, DNP, Ilda Jean-Baptiste, CNP, Milagros Messina, CNP, Florence Saenz, CNP, Cristina Belle, CNP, Devika Olvera, PA-C, Mary Kirkpatrick PA-C, Cherry aWtson, CNP, Erma Cifuentes, CNP, Benny Marie, CNP, Pebbles Proctor, CNP, Holly Jones, CNP, Altagracia Iglesias, CNS, Sahnnan Cruz, CNP, Erika Velez CNP, Tracy Schwab, CNP         [facility]   IN-PATIENT SERVICE   [facility]    Progress Note    6/18/2024    10:36 AM    Name:   Chayito Navas  MRN:     [id number]     Acct:      [id number]   Room:   0535/0535-01   Day:  9  Admit Date:  6/9/2024  1:32 PM    PCP:   Brijesh Perez MD  Code Status:  Full Code    Subjective:     C/C:   Chief Complaint   Patient presents with    Dysphagia     Interval History Status: not changed.     No issues overnight  Awaiting set up of home tube feeds  Discussed with CM  No other complaints     Brief History:     Chayito Navas is a 78 y.o. female with history of interstitial lung disease, rheumatoid arthritis, hypertension, asthma colon cancer in 2012 presented to the hospital for dysphagia. She was recently seen at [facility] and underwent EGD/Dilatation 5/24.per patient and family she has been experiencing difficulty

## 2024-06-18 NOTE — DISCHARGE SUMMARY
[facility]  Office: [phone]  Dario Verma DO, Dustin Amador DO, Nathan De La Cruz DO, Juarez Su DO, April Zamorano MD, Erlinda Sanchez MD, Everette Harden MD, Bridget Rowe MD,  Celestino Smith MD, Arianna Lopes MD, Kade Joaquin MD,  Brigette Almonte DO, Kolton Linder MD, Vijay Childers MD, Timothy Verma DO, Jenn Camp MD,  Lawson Diallo DO, Lucy Liriano MD, Allyn Kruger MD, Loulou Resendiz MD, Luz Jordan MD,  Dick Stock MD, Shanon Ugalde MD, Vy Armendariz MD, Brennan Yo MD, Ramiro Kent MD, Alan Thompson MD, Niko King DO, Roberto Bhatt DO, Gianfranco Henriquez MD,  Fernandez Tilley MD, Shirley Waterhouse, CNP,  Yoanna Medina CNP, Juarez Webb, CNP,  Merary Crespo, DNP, Ilda Jean-Baptiste, CNP, Milagros Messina, CNP, Florence Saenz CNP, Cristina Belle, CNP, Devika Olvera, PA-C, Mary Kirkpatrick PA-C, Cherry Watson, CNP, Erma Cifuentes, CNP, Benny Marie, CNP, Pebbles Proctor, CNP, Holly Jones, CNP, Altagracia Iglesias, CNS, Shannan Cruz, CNP, Erika Velez CNP, Tracy Schwab, CNP         [facility]   IN-PATIENT SERVICE   [facility]    Discharge Summary     Patient ID: Chayito Navas  :  1945   MRN: [id number]     ACCOUNT:  [id number]   Patient's PCP: Brijesh Perez MD  Admit Date: 2024   Discharge Date: 2024  Length of Stay: 9  Code Status:  Full Code  Admitting Physician: No admitting provider for patient encounter.  Discharge Physician: Celestino Smith MD     Active Discharge Diagnoses:     Hospital Problem Lists:  Principal Problem:    Bulbar palsy (HCC)  Active Problems:    NSTEMI (non-ST elevated myocardial infarction) (HCC)    Dysphagia    Interstitial lung disease (HCC)    Hypertension    Weight loss    History of colon cancer    Severe malnutrition (HCC)    Elevated troponin    RA (rheumatoid arthritis) (HCC)    Anemia associated with nutritional deficiency    Malnutrition (HCC)    Dysarthria    Aspiration      These medications were sent to [facility] - [place], [place] - [address] [place] - P [phone] - F [phone] [address], [place] [address]      Phone: [phone]   pantoprazole 40 MG tablet       Information about where to get these medications is not yet available    Ask your nurse or doctor about these medications  aspirin 81 MG chewable tablet  atorvastatin 20 MG tablet  metoprolol tartrate 25 MG tablet  pyRIDostigmine 60 MG tablet         Discharge Procedure Orders   External Referral To Neurology   Referral Priority: Routine Referral Type: Eval and Treat   Referral Reason: Specialty Services Required   Referred to Provider: HAYLEY MAX Requested Specialty: Neurology   Number of Visits Requested: 1       Time Spent on discharge is  35 mins in patient examination, evaluation, counseling as well as medication reconciliation, prescriptions for required medications, discharge plan and follow up.    Electronically signed by   Celestino Smith MD  6/18/2024  1:13 PM      Thank you Brijesh Curry MD for the opportunity to be involved in this patient's care.

## 2024-07-10 PROBLEM — R79.89 ELEVATED TROPONIN: Status: RESOLVED | Noted: 2024-06-10 | Resolved: 2024-07-10

## 2024-09-05 RX ORDER — PYRIDOSTIGMINE BROMIDE 60 MG/1
TABLET ORAL
Qty: 60 TABLET | Refills: 1 | OUTPATIENT
Start: 2024-09-05

## 2024-09-12 RX ORDER — PANTOPRAZOLE SODIUM 40 MG/1
40 TABLET, DELAYED RELEASE ORAL DAILY
Qty: 30 TABLET | Refills: 2 | OUTPATIENT
Start: 2024-09-12

## 2024-11-14 RX ORDER — ATORVASTATIN CALCIUM 20 MG/1
20 TABLET, FILM COATED ORAL NIGHTLY
Qty: 60 TABLET | OUTPATIENT
Start: 2024-11-14

## 2024-11-14 NOTE — TELEPHONE ENCOUNTER
Chayito Navas is calling to request a refill on the following medication(s):    Medication Request:  Requested Prescriptions     Pending Prescriptions Disp Refills    atorvastatin (LIPITOR) 20 MG tablet [Pharmacy Med Name: ATORVASTATIN 20 MG TABLET] 60 tablet      Sig: TAKE 1 TABLET BY MOUTH NIGHTLY       Last Visit Date (If Applicable):  Visit date not found    Next Visit Date:    Visit date not found

## (undated) DEVICE — MIC* SAFETY PERCUTANEOUS ENDOSCOPIC GASTROSTOMY PEG KIT - 20 FR - PULL: Brand: MIC PEG TUBE